# Patient Record
Sex: FEMALE | URBAN - METROPOLITAN AREA
[De-identification: names, ages, dates, MRNs, and addresses within clinical notes are randomized per-mention and may not be internally consistent; named-entity substitution may affect disease eponyms.]

---

## 2017-05-04 ENCOUNTER — HOSPITAL ENCOUNTER (OUTPATIENT)
Dept: MAMMOGRAPHY | Age: 47
Discharge: HOME | End: 2017-05-04

## 2017-05-04 DIAGNOSIS — Z12.31 VISIT FOR SCREENING MAMMOGRAM: ICD-10-CM

## 2019-10-22 ENCOUNTER — APPOINTMENT (OUTPATIENT)
Dept: GENERAL RADIOLOGY | Facility: HOSPITAL | Age: 49
End: 2019-10-22

## 2019-10-22 ENCOUNTER — APPOINTMENT (OUTPATIENT)
Dept: CT IMAGING | Facility: HOSPITAL | Age: 49
End: 2019-10-22

## 2019-10-22 ENCOUNTER — HOSPITAL ENCOUNTER (EMERGENCY)
Facility: HOSPITAL | Age: 49
Discharge: HOME | End: 2019-10-22
Attending: EMERGENCY MEDICINE | Admitting: EMERGENCY MEDICINE

## 2019-10-22 VITALS
SYSTOLIC BLOOD PRESSURE: 122 MMHG | RESPIRATION RATE: 16 BRPM | DIASTOLIC BLOOD PRESSURE: 84 MMHG | WEIGHT: 162 LBS | TEMPERATURE: 98.4 F | OXYGEN SATURATION: 94 % | BODY MASS INDEX: 25.43 KG/M2 | HEART RATE: 71 BPM | HEIGHT: 67 IN

## 2019-10-22 DIAGNOSIS — R55 SYNCOPE, UNSPECIFIED SYNCOPE TYPE: Primary | ICD-10-CM

## 2019-10-22 DIAGNOSIS — R07.9 CHEST PAIN, UNSPECIFIED TYPE: ICD-10-CM

## 2019-10-22 DIAGNOSIS — Z87.09 HISTORY OF ASTHMA: ICD-10-CM

## 2019-10-22 LAB
ALBUMIN SERPL-MCNC: 4.4 G/DL (ref 3.5–5.2)
ALBUMIN/GLOB SERPL: 1.5 G/DL
ALP SERPL-CCNC: 65 U/L (ref 39–117)
ALT SERPL W P-5'-P-CCNC: 19 U/L (ref 1–33)
ANION GAP SERPL CALCULATED.3IONS-SCNC: 10 MMOL/L (ref 5–15)
AST SERPL-CCNC: 19 U/L (ref 1–32)
BASOPHILS # BLD AUTO: 0.04 10*3/MM3 (ref 0–0.2)
BASOPHILS NFR BLD AUTO: 0.7 % (ref 0–1.5)
BILIRUB SERPL-MCNC: 0.3 MG/DL (ref 0.2–1.2)
BUN BLD-MCNC: 15 MG/DL (ref 6–20)
BUN/CREAT SERPL: 17.9 (ref 7–25)
CALCIUM SPEC-SCNC: 9.7 MG/DL (ref 8.6–10.5)
CHLORIDE SERPL-SCNC: 102 MMOL/L (ref 98–107)
CO2 SERPL-SCNC: 27 MMOL/L (ref 22–29)
CREAT BLD-MCNC: 0.84 MG/DL (ref 0.57–1)
DEPRECATED RDW RBC AUTO: 43.3 FL (ref 37–54)
EOSINOPHIL # BLD AUTO: 0.05 10*3/MM3 (ref 0–0.4)
EOSINOPHIL NFR BLD AUTO: 0.9 % (ref 0.3–6.2)
ERYTHROCYTE [DISTWIDTH] IN BLOOD BY AUTOMATED COUNT: 12.6 % (ref 12.3–15.4)
GFR SERPL CREATININE-BSD FRML MDRD: 71 ML/MIN/1.73
GLOBULIN UR ELPH-MCNC: 3 GM/DL
GLUCOSE BLD-MCNC: 99 MG/DL (ref 65–99)
HCT VFR BLD AUTO: 45.2 % (ref 34–46.6)
HGB BLD-MCNC: 14.6 G/DL (ref 12–15.9)
HOLD SPECIMEN: NORMAL
HOLD SPECIMEN: NORMAL
IMM GRANULOCYTES # BLD AUTO: 0.04 10*3/MM3 (ref 0–0.05)
IMM GRANULOCYTES NFR BLD AUTO: 0.7 % (ref 0–0.5)
LIPASE SERPL-CCNC: 23 U/L (ref 13–60)
LYMPHOCYTES # BLD AUTO: 1.55 10*3/MM3 (ref 0.7–3.1)
LYMPHOCYTES NFR BLD AUTO: 27 % (ref 19.6–45.3)
MCH RBC QN AUTO: 30.1 PG (ref 26.6–33)
MCHC RBC AUTO-ENTMCNC: 32.3 G/DL (ref 31.5–35.7)
MCV RBC AUTO: 93.2 FL (ref 79–97)
MONOCYTES # BLD AUTO: 0.63 10*3/MM3 (ref 0.1–0.9)
MONOCYTES NFR BLD AUTO: 11 % (ref 5–12)
NEUTROPHILS # BLD AUTO: 3.43 10*3/MM3 (ref 1.7–7)
NEUTROPHILS NFR BLD AUTO: 59.7 % (ref 42.7–76)
NRBC BLD AUTO-RTO: 0 /100 WBC (ref 0–0.2)
NT-PROBNP SERPL-MCNC: 16.2 PG/ML (ref 5–900)
PLATELET # BLD AUTO: 249 10*3/MM3 (ref 140–450)
PMV BLD AUTO: 9.7 FL (ref 6–12)
POTASSIUM BLD-SCNC: 4.4 MMOL/L (ref 3.5–5.2)
PROT SERPL-MCNC: 7.4 G/DL (ref 6–8.5)
RBC # BLD AUTO: 4.85 10*6/MM3 (ref 3.77–5.28)
SODIUM BLD-SCNC: 139 MMOL/L (ref 136–145)
TROPONIN T SERPL-MCNC: <0.01 NG/ML (ref 0–0.03)
TROPONIN T SERPL-MCNC: <0.01 NG/ML (ref 0–0.03)
WBC NRBC COR # BLD: 5.74 10*3/MM3 (ref 3.4–10.8)
WHOLE BLOOD HOLD SPECIMEN: NORMAL
WHOLE BLOOD HOLD SPECIMEN: NORMAL

## 2019-10-22 PROCEDURE — 83880 ASSAY OF NATRIURETIC PEPTIDE: CPT | Performed by: EMERGENCY MEDICINE

## 2019-10-22 PROCEDURE — 85025 COMPLETE CBC W/AUTO DIFF WBC: CPT | Performed by: EMERGENCY MEDICINE

## 2019-10-22 PROCEDURE — 94640 AIRWAY INHALATION TREATMENT: CPT

## 2019-10-22 PROCEDURE — 71045 X-RAY EXAM CHEST 1 VIEW: CPT

## 2019-10-22 PROCEDURE — 99285 EMERGENCY DEPT VISIT HI MDM: CPT

## 2019-10-22 PROCEDURE — 36415 COLL VENOUS BLD VENIPUNCTURE: CPT

## 2019-10-22 PROCEDURE — 93005 ELECTROCARDIOGRAM TRACING: CPT | Performed by: EMERGENCY MEDICINE

## 2019-10-22 PROCEDURE — 70450 CT HEAD/BRAIN W/O DYE: CPT

## 2019-10-22 PROCEDURE — 83690 ASSAY OF LIPASE: CPT | Performed by: EMERGENCY MEDICINE

## 2019-10-22 PROCEDURE — 94799 UNLISTED PULMONARY SVC/PX: CPT

## 2019-10-22 PROCEDURE — 80053 COMPREHEN METABOLIC PANEL: CPT | Performed by: EMERGENCY MEDICINE

## 2019-10-22 PROCEDURE — 84484 ASSAY OF TROPONIN QUANT: CPT | Performed by: EMERGENCY MEDICINE

## 2019-10-22 RX ORDER — IPRATROPIUM BROMIDE AND ALBUTEROL SULFATE 2.5; .5 MG/3ML; MG/3ML
3 SOLUTION RESPIRATORY (INHALATION) ONCE
Status: COMPLETED | OUTPATIENT
Start: 2019-10-22 | End: 2019-10-22

## 2019-10-22 RX ORDER — LISINOPRIL 20 MG/1
20 TABLET ORAL DAILY
COMMUNITY

## 2019-10-22 RX ORDER — ASPIRIN 81 MG/1
324 TABLET, CHEWABLE ORAL ONCE
Status: COMPLETED | OUTPATIENT
Start: 2019-10-22 | End: 2019-10-22

## 2019-10-22 RX ORDER — ESCITALOPRAM OXALATE 20 MG/1
20 TABLET ORAL DAILY
COMMUNITY

## 2019-10-22 RX ORDER — SODIUM CHLORIDE 0.9 % (FLUSH) 0.9 %
10 SYRINGE (ML) INJECTION AS NEEDED
Status: DISCONTINUED | OUTPATIENT
Start: 2019-10-22 | End: 2019-10-22 | Stop reason: HOSPADM

## 2019-10-22 RX ORDER — ALBUTEROL SULFATE 90 UG/1
2 AEROSOL, METERED RESPIRATORY (INHALATION) EVERY 6 HOURS PRN
Qty: 3.7 G | Refills: 0 | Status: SHIPPED | OUTPATIENT
Start: 2019-10-22 | End: 2019-10-29

## 2019-10-22 RX ORDER — EPINEPHRINE 0.3 MG/.3ML
0.3 INJECTION SUBCUTANEOUS ONCE
Qty: 1 EACH | Refills: 0 | Status: SHIPPED | OUTPATIENT
Start: 2019-10-22 | End: 2019-10-22

## 2019-10-22 RX ORDER — ALBUTEROL SULFATE 0.63 MG/3ML
1 SOLUTION RESPIRATORY (INHALATION) EVERY 6 HOURS PRN
Qty: 3 ML | Refills: 0 | Status: SHIPPED | OUTPATIENT
Start: 2019-10-22

## 2019-10-22 RX ADMIN — IPRATROPIUM BROMIDE AND ALBUTEROL SULFATE 3 ML: 2.5; .5 SOLUTION RESPIRATORY (INHALATION) at 11:55

## 2019-10-22 RX ADMIN — ASPIRIN 81 MG 324 MG: 81 TABLET ORAL at 10:49

## 2019-10-22 NOTE — ED PROVIDER NOTES
"Subjective   Yamilet Allan is a 49 y.o.female who presents to the ED status post syncopal episode. The patient experienced an episode of syncope earlier today. Prior to the syncopal episode, the patient had been experiencing a cough before ultimately having an \"asthma attack\". While having the asthma attack, she lost consciousness according to one of her co workers and slumped over in her chair. Upon regaining consciousness, she did utilize an inhaler which helped relieve her asthma attack. Of note, she states she has experienced multiple syncopal episodes while having an asthma attack in the past, for she states she has required intubation in the past. She currently complains of mild chest tightness and fatigue. She denies any sore throat or rhinorrhea. She states she did experience vomiting and diarrhea last week, but it has resolved. Moreover, she has a history of HTN, HLD, CVA, A Fib ? (not anticoagulated), and hypertropic cardiomyopathy. She has hx of anaphylactic reactions to mold and environmental triggers but states this was not her typical anaphylactic reaction but they did clean her office yesterday. She also started taking an antidepressant three weeks ago and admits to increased stress with starting a new job 5 weeks ago and daughter having a miscarriage 3 weeks ago. She had been followed by cardiologist Dr. Barnard at [facility] in [place] but moved to [facility] about a year ago and hasn't established with cardiology. She has hx of a Loop recorder placed 5/2018 due to her syncope. No prior hx of cardiac stents. There are no other complaints at this time.         History provided by:  Patient, relative and medical records  Syncope   Episode history:  Single  Most recent episode:  Today  Duration: momentarily.  Timing:  Constant  Progression:  Resolved  Chronicity:  Recurrent  Context: sitting down    Witnessed: yes    Relieved by:  None tried  Worsened by:  Nothing  Ineffective treatments:  None tried  Associated " symptoms: anxiety, chest pain and shortness of breath    Associated symptoms: no dizziness, no fever, no focal sensory loss, no focal weakness, no headaches, no malaise/fatigue, no nausea, no palpitations, no vomiting and no weakness    Risk factors: no coronary artery disease and no seizures        Review of Systems   Constitutional: Positive for fatigue. Negative for fever and malaise/fatigue.   HENT: Negative for rhinorrhea and sore throat.    Respiratory: Positive for cough, chest tightness and shortness of breath.    Cardiovascular: Positive for chest pain and syncope. Negative for palpitations and leg swelling.   Gastrointestinal: Negative for diarrhea, nausea and vomiting.   Neurological: Positive for syncope. Negative for dizziness, focal weakness, weakness and headaches.   All other systems reviewed and are negative.      Past Medical History:   Diagnosis Date   • Asthma    • Cardiomyopathy (CMS/HCC)    • Hyperlipidemia    • Hypertension    • Stroke (CMS/HCC)        Allergies   Allergen Reactions   • Penicillins Anaphylaxis       Past Surgical History:   Procedure Laterality Date   • ADENOIDECTOMY     •  SECTION     • OTHER SURGICAL HISTORY      LOOP RECORDER   • RHINOPLASTY     • TONSILLECTOMY         History reviewed. No pertinent family history.    Social History     Socioeconomic History   • Marital status: Single     Spouse name: Not on file   • Number of children: Not on file   • Years of education: Not on file   • Highest education level: Not on file   Tobacco Use   • Smoking status: Never Smoker   • Smokeless tobacco: Never Used   Substance and Sexual Activity   • Alcohol use: No     Frequency: Never   • Drug use: No   • Sexual activity: Defer         Objective   Physical Exam   Constitutional: She is oriented to person, place, and time. She appears well-developed and well-nourished.   Emotional, tearful.    HENT:   Head: Normocephalic and atraumatic.   Eyes: Conjunctivae are normal. No  scleral icterus.   Neck: Normal range of motion. Neck supple.   Cardiovascular: Normal rate, regular rhythm, normal heart sounds and intact distal pulses.   No murmur heard.  Pulmonary/Chest: Effort normal and breath sounds normal. No respiratory distress. She has no wheezes.   Abdominal: Soft. Bowel sounds are normal. There is no tenderness.   Musculoskeletal: Normal range of motion. She exhibits no edema.   Neurological: She is alert and oriented to person, place, and time.   Skin: Skin is warm and dry.   Psychiatric:   Emotional, tearful.    Nursing note and vitals reviewed.      Procedures         ED Course      Re-examined patient several times in ED. Pt resting, no distress, vitals stable. Discussed results and tx plan. Pt feeling better after Neb. She admits to being out of her Albuterol inhaler, Neb solution and Epi pens. Will refill Rx's and discussed close f/u with PCP. Will refer to cardiac clinic / syncope clinic. She will return to ED if new or worse sx.     Discussed patient with Dr. Emmanuel who is agreeable with ED course.     Reviewed old records.   [facility]  Transthoracic echo December 2017. Normal LV and RV function. Grade 2 diastolic dysfunction.    Exercise nuclear stress test. December 2017. 9 minutes 45 seconds. Cruz treadmill score of 10. Low risk. Normal perfusion and function       Recent Results (from the past 24 hour(s))   Troponin    Collection Time: 10/22/19 11:09 AM   Result Value Ref Range    Troponin T <0.010 0.000 - 0.030 ng/mL   Comprehensive Metabolic Panel    Collection Time: 10/22/19 11:09 AM   Result Value Ref Range    Glucose 99 65 - 99 mg/dL    BUN 15 6 - 20 mg/dL    Creatinine 0.84 0.57 - 1.00 mg/dL    Sodium 139 136 - 145 mmol/L    Potassium 4.4 3.5 - 5.2 mmol/L    Chloride 102 98 - 107 mmol/L    CO2 27.0 22.0 - 29.0 mmol/L    Calcium 9.7 8.6 - 10.5 mg/dL    Total Protein 7.4 6.0 - 8.5 g/dL    Albumin 4.40 3.50 - 5.20 g/dL    ALT (SGPT) 19 1 - 33 U/L    AST  (SGOT) 19 1 - 32 U/L    Alkaline Phosphatase 65 39 - 117 U/L    Total Bilirubin 0.3 0.2 - 1.2 mg/dL    eGFR Non African Amer 71 >60 mL/min/1.73    Globulin 3.0 gm/dL    A/G Ratio 1.5 g/dL    BUN/Creatinine Ratio 17.9 7.0 - 25.0    Anion Gap 10.0 5.0 - 15.0 mmol/L   Lipase    Collection Time: 10/22/19 11:09 AM   Result Value Ref Range    Lipase 23 13 - 60 U/L   BNP    Collection Time: 10/22/19 11:09 AM   Result Value Ref Range    proBNP 16.2 5.0 - 900.0 pg/mL   Light Blue Top    Collection Time: 10/22/19 11:09 AM   Result Value Ref Range    Extra Tube hold for add-on    Green Top (Gel)    Collection Time: 10/22/19 11:09 AM   Result Value Ref Range    Extra Tube Hold for add-ons.    Gold Top - SST    Collection Time: 10/22/19 11:09 AM   Result Value Ref Range    Extra Tube Hold for add-ons.    Lavender Top    Collection Time: 10/22/19 11:10 AM   Result Value Ref Range    Extra Tube hold for add-on    CBC Auto Differential    Collection Time: 10/22/19 11:10 AM   Result Value Ref Range    WBC 5.74 3.40 - 10.80 10*3/mm3    RBC 4.85 3.77 - 5.28 10*6/mm3    Hemoglobin 14.6 12.0 - 15.9 g/dL    Hematocrit 45.2 34.0 - 46.6 %    MCV 93.2 79.0 - 97.0 fL    MCH 30.1 26.6 - 33.0 pg    MCHC 32.3 31.5 - 35.7 g/dL    RDW 12.6 12.3 - 15.4 %    RDW-SD 43.3 37.0 - 54.0 fl    MPV 9.7 6.0 - 12.0 fL    Platelets 249 140 - 450 10*3/mm3    Neutrophil % 59.7 42.7 - 76.0 %    Lymphocyte % 27.0 19.6 - 45.3 %    Monocyte % 11.0 5.0 - 12.0 %    Eosinophil % 0.9 0.3 - 6.2 %    Basophil % 0.7 0.0 - 1.5 %    Immature Grans % 0.7 (H) 0.0 - 0.5 %    Neutrophils, Absolute 3.43 1.70 - 7.00 10*3/mm3    Lymphocytes, Absolute 1.55 0.70 - 3.10 10*3/mm3    Monocytes, Absolute 0.63 0.10 - 0.90 10*3/mm3    Eosinophils, Absolute 0.05 0.00 - 0.40 10*3/mm3    Basophils, Absolute 0.04 0.00 - 0.20 10*3/mm3    Immature Grans, Absolute 0.04 0.00 - 0.05 10*3/mm3    nRBC 0.0 0.0 - 0.2 /100 WBC     Note: In addition to lab results from this visit, the labs listed above  may include labs taken at another facility or during a different encounter within the last 24 hours. Please correlate lab times with ED admission and discharge times for further clarification of the services performed during this visit.    CT Head Without Contrast   Preliminary Result   Normal unenhanced CT scan of the head.       D:  10/22/2019   E:  10/22/2019                  XR Chest 1 View   Preliminary Result   No active disease.       D:  10/22/2019   E:  10/22/2019                Vitals:    10/22/19 1230 10/22/19 1233 10/22/19 1259 10/22/19 1300   BP: 108/74   130/76   Pulse: 79 75 86    Resp:       Temp:       TempSrc:       SpO2: 90% 92% 92%    Weight:       Height:         Medications   sodium chloride 0.9 % flush 10 mL (not administered)   aspirin chewable tablet 324 mg (324 mg Oral Given 10/22/19 1049)   ipratropium-albuterol (DUO-NEB) nebulizer solution 3 mL (3 mL Nebulization Given 10/22/19 1155)     ECG/EMG Results (last 24 hours)     Procedure Component Value Units Date/Time    ECG 12 Lead [[id number]] Collected:  10/22/19 1051     Updated:  10/22/19 1105        ECG 12 Lead   Final Result   Test Reason : 2nd set   Blood Pressure : **/** mmHG   Vent. Rate : 072 BPM     Atrial Rate : 072 BPM      P-R Int : 198 ms          QRS Dur : 084 ms       QT Int : 436 ms       P-R-T Axes : 016 -18 015 degrees      QTc Int : 477 ms      Normal sinus rhythm   Inferior infarct (cited on or before 22-OCT-2019)   Possible Anterior infarct (cited on or before 22-OCT-2019)   Abnormal ECG   When compared with ECG of 22-OCT-2019 10:51,   No significant change was found   Confirmed by TIFFANY DAMIAN MD ([id number]) on 10/22/2019 1:26:22 PM      Referred By:  edmd           Confirmed By:TIFFANY DAMIAN MD      ECG 12 Lead   Final Result   Test Reason : chest pain   Blood Pressure : **/** mmHG   Vent. Rate : 077 BPM     Atrial Rate : 077 BPM      P-R Int : 212 ms          QRS Dur : 080 ms       QT Int : 406 ms       P-R-T Axes :  028 -20 019 degrees      QTc Int : 459 ms      Sinus rhythm with 1st degree AV block   no stemi   Inferior infarct , age undetermined   Anterior infarct , age undetermined   Abnormal ECG   No previous ECGs available   Confirmed by TIFFANY DAMIAN MD ([id number]) on 10/22/2019 11:20:36 AM      Referred By:  veena smith           Confirmed By:TIFFANY DAMIAN MD                          [facility]    Final diagnoses:   Syncope, unspecified syncope type   History of asthma   Chest pain, unspecified type       Documentation assistance provided by deysi Roger.  Information recorded by the scribe was done at my direction and has been verified and validated by me.     Enzo Roger  10/22/19 1119       Julissa Danielson PA  10/22/19 2865

## 2020-01-14 ENCOUNTER — CONSULT (OUTPATIENT)
Dept: CARDIOLOGY | Facility: CLINIC | Age: 50
End: 2020-01-14

## 2020-01-14 VITALS
SYSTOLIC BLOOD PRESSURE: 122 MMHG | WEIGHT: 193 LBS | HEART RATE: 74 BPM | DIASTOLIC BLOOD PRESSURE: 86 MMHG | BODY MASS INDEX: 30.29 KG/M2 | HEIGHT: 67 IN | OXYGEN SATURATION: 98 %

## 2020-01-14 DIAGNOSIS — R55 SYNCOPE AND COLLAPSE: Primary | ICD-10-CM

## 2020-01-14 DIAGNOSIS — I10 ESSENTIAL HYPERTENSION: ICD-10-CM

## 2020-01-14 PROCEDURE — 99204 OFFICE O/P NEW MOD 45 MIN: CPT | Performed by: INTERNAL MEDICINE

## 2020-01-14 RX ORDER — EPINEPHRINE 0.3 MG/.3ML
INJECTION SUBCUTANEOUS AS NEEDED
COMMUNITY
Start: 2019-11-05

## 2020-01-14 NOTE — PROGRESS NOTES
[facility]  Consultation H&P  Yamilet Allan  1970    There is no work phone number on file..    VISIT DATE:  01/14/2020    PCP: Jaimee Bobo MD  [address] [place] [id number]    CC:  Chief Complaint   Patient presents with   • Atrial Fibrillation   • Chest Pain   • Shortness of Breath   • Dizziness   • Leg Pain     Previous cardiac studies and procedures:  November 2017  - event monitor for palpitations: baseline rhythm sinus. Rare PVCs and couplets. Multiple episodes of chest discomfort and dyspnea and skipped beats during which the patient was either in sinus rhythm or sinus tachycardia with rare PVCs and couplets during such episodes    December 2017   Echo: Normal EF, grade 2 diastolic dysfunction  Exercise myocardial perfusion imaging: Preserved functional capacity, normal EF, no ischemia or scar.    Feb 2018: Medtronic loop recorder implant    ASSESSMENT:   Diagnosis Plan   1. Syncope and collapse     2. Essential hypertension       Device interrogation:  Medtronic loop  No arrhythmia.    PLAN:  Recurrent syncope: Previous evaluation has been negative for arrhythmia.  Suspect due to predominant vasodepressor, neurocardiogenic etiology potentially due to the some underlying autonomic dysfunction.  Continue conservative measures.  Avoid dehydration.  Counseled on regular exercise, avoidance of sleep deprivation.  We will continue to trend for arrhythmia through loop recorder.  She does wish to have it explanted once the battery is at end-of-life.    History of stroke: No residual deficits.  Unclear which healthcare facility from which to obtain this previous evaluation.  Will request any records obtained by her primary care physician.  Would recommend initiation of low-dose aspirin and statin therapy if this event could be corroborated.    Family history of hypertrophic cardiomyopathy: Counseled her that she needs to find out more information regarding the specific  genetic mutation of her brother, once this information is obtained it would be easy to have her tested.  Repeat transthoracic echo pending to assess underlying myocardial structure and function.    Hypertension: Goal less than 130/80 mmHg.  Currently well controlled.  Continue nighttime dosing of lisinopril.    History of Present Illness   49-year-old female with a history of recurrent syncope, reported stroke, potential hypertrophic cardiomyopathy, palpitations, and hypertension.  She has had a fragmented healthcare over the previous 5 years and multiple regions to include [place] followed by [facility] followed by [facility] [place].  Per her history she was evaluated for a stroke in approximately 2015.  It is unclear exactly which medical facility this evaluation occurred.  She was transiently on aspirin and statin therapy following this.  Shortly before this episode she began to have recurrent unexplained syncope.  She would have brief loss of consciousness, usually in the standing position but would intermittently occur in the seated position.  No significant prodrome.  Intermittently appear to be induced by allergy induced asthma with coughing however she does not feel that this was a significant trigger.  Some mild associated nausea but no chest discomfort or palpitations.  She underwent loop recorder implantation in February 2018, it has been negative for arrhythmia.  She has had 3 episodes of syncope over the previous 3 to 4 months.  Event monitor negative for significant arrhythmia in November 2017.  She underwent an echo and myocardial perfusion imaging in December 2017 which was remarkable for diastolic dysfunction.  She reports that her brother who is 60 has heart failure and has had multiple myocardial infarctions, bypass surgery and is potentially being considered for heart transplant.  It appears he was also diagnosed with a genetic mutation for hypertrophic cardiomyopathy.  She also has a  "sister with symptomatic obstructive coronary disease.  She is not been tested for hypertrophic cardiomyopathy.  Her previous cardiologist gave her the diagnosis of hypertrophic cardiomyopathy although significant LVH is not reported on the echo from December 2017.  Her blood pressure appears to be well controlled, typically running less than 130/80 mmHg.  She takes her lisinopril prior to bedtime.  No history of tilt table testing.    PHYSICAL EXAMINATION:  Vitals:    01/14/20 1506   Weight: 87.5 kg (193 lb)   Height: 170.2 cm (67\")     General Appearance:    Alert, cooperative, no distress, appears stated age   Head:    Normocephalic, without obvious abnormality, atraumatic   Eyes:    conjunctiva/corneas clear, EOM's intact, fundi     benign, both eyes   Ears:    Normal TM's and external ear canals, both ears   Nose:   Nares normal, septum midline, mucosa normal, no drainage    or sinus tenderness   Throat:   Lips, mucosa, and tongue normal; teeth and gums normal   Neck:   Supple, symmetrical, trachea midline, no adenopathy;     thyroid:  no enlargement/tenderness/nodules; no carotid    bruit or JVD   Back:     Symmetric, no curvature, ROM normal, no CVA tenderness   Lungs:     Clear to auscultation bilaterally, respirations unlabored   Chest Wall:    No tenderness or deformity    Heart:    Regular rate and rhythm, S1 and S2 normal, no murmur, rub   or gallop, normal carotid impulse bilaterally without bruit.   Abdomen:     Soft, non-tender, bowel sounds active all four quadrants,     no masses, no organomegaly   Extremities:   Extremities normal, atraumatic, no cyanosis or edema   Pulses:   2+ and symmetric all extremities   Skin:   Skin color, texture, turgor normal, no rashes or lesions   Lymph nodes:   Cervical, supraclavicular, and axillary nodes normal   Neurologic:   normal strength, sensation intact     throughout       Diagnostic Data:  Procedures  No results found for: CHLPL, TRIG, HDL, LDLDIRECT  Lab " Results   Component Value Date    GLUCOSE 99 10/22/2019    BUN 15 10/22/2019    CREATININE 0.84 10/22/2019     10/22/2019    K 4.4 10/22/2019     10/22/2019    CO2 27.0 10/22/2019     No results found for: HGBA1C  Lab Results   Component Value Date    WBC 5.74 10/22/2019    HGB 14.6 10/22/2019    HCT 45.2 10/22/2019     10/22/2019       PROBLEM LIST:  Patient Active Problem List   Diagnosis   • Syncope and collapse   • Essential hypertension       PAST MEDICAL HX  Past Medical History:   Diagnosis Date   • Anxiety and depression    • Asthma    • Cardiomyopathy (CMS/HCC)    • Hyperlipidemia    • Hypertension    • Menopause    • Stroke (CMS/HCC)        Allergies  Allergies   Allergen Reactions   • Penicillins Anaphylaxis       Current Medications    Current Outpatient Medications:   •  albuterol (ACCUNEB) 0.63 MG/3ML nebulizer solution, Take 3 mL by nebulization Every 6 (Six) Hours As Needed for Wheezing., Disp: 3 mL, Rfl: 0  •  EPINEPHrine (EPIPEN) 0.3 MG/0.3ML solution auto-injector injection, As Needed., Disp: , Rfl:   •  escitalopram (LEXAPRO) 10 MG tablet, Take 10 mg by mouth Daily., Disp: , Rfl:   •  lisinopril (PRINIVIL,ZESTRIL) 40 MG tablet, Take 40 mg by mouth Daily., Disp: , Rfl:          ROS  Review of Systems   Constitution: Positive for malaise/fatigue and weight gain.   HENT: Positive for tinnitus.    Cardiovascular: Positive for chest pain and palpitations.   Respiratory: Positive for cough and shortness of breath.    Gastrointestinal: Positive for nausea.   Psychiatric/Behavioral: Positive for memory loss.     All other body systems reviewed and are negative    SOCIAL HX  Social History     Socioeconomic History   • Marital status: Single     Spouse name: Not on file   • Number of children: Not on file   • Years of education: Not on file   • Highest education level: Not on file   Tobacco Use   • Smoking status: Never Smoker   • Smokeless tobacco: Never Used   Substance and Sexual  Activity   • Alcohol use: No     Frequency: Never   • Drug use: No   • Sexual activity: Defer       FAMILY HX  Family History   Problem Relation Age of Onset   • Hypertension Mother    • Heart failure Father    • Cardiomyopathy Sister    • Heart attack Sister    • Diabetes Sister    • Stroke Brother    • Heart attack Brother    • Hypertension Brother    • Hyperlipidemia Brother    • Stroke Brother    • Hyperlipidemia Brother    • Hypertension Brother    • Hypertension Sister    • Hypertension Sister    • Hypertension Sister    • Hypertension Sister    • Hypertension Sister    • No Known Problems Sister    • No Known Problems Sister              Veto Atwood III, MD, FAC

## 2020-01-22 ENCOUNTER — TELEPHONE (OUTPATIENT)
Dept: CARDIOLOGY | Facility: CLINIC | Age: 50
End: 2020-01-22

## 2020-01-22 NOTE — TELEPHONE ENCOUNTER
Patient notified of message above from . Letter written. Patient verbalized understanding and is going to  letter today at office.

## 2020-01-22 NOTE — TELEPHONE ENCOUNTER
was sitting in a meeting at work yesterday and she hyperventilated. Passed out for a few seconds.She has been doing deep breathing exercises to help. She thinks its anxiety. Her work is requesting a note from her Cardiologist for her to be able to return. She is requesting a note that  can return to work with no restrictions including driving. Please advise.

## 2020-09-08 ENCOUNTER — OFFICE VISIT (OUTPATIENT)
Dept: CARDIOLOGY | Facility: CLINIC | Age: 50
End: 2020-09-08

## 2020-09-08 VITALS
HEIGHT: 68 IN | SYSTOLIC BLOOD PRESSURE: 114 MMHG | BODY MASS INDEX: 27.43 KG/M2 | OXYGEN SATURATION: 95 % | WEIGHT: 181 LBS | TEMPERATURE: 97.5 F | DIASTOLIC BLOOD PRESSURE: 76 MMHG | HEART RATE: 64 BPM

## 2020-09-08 DIAGNOSIS — I10 ESSENTIAL HYPERTENSION: ICD-10-CM

## 2020-09-08 DIAGNOSIS — R55 SYNCOPE AND COLLAPSE: Primary | ICD-10-CM

## 2020-09-08 PROCEDURE — 99214 OFFICE O/P EST MOD 30 MIN: CPT | Performed by: INTERNAL MEDICINE

## 2020-09-08 PROCEDURE — 93291 INTERROG DEV EVAL SCRMS IP: CPT | Performed by: INTERNAL MEDICINE

## 2020-09-08 NOTE — PROGRESS NOTES
[facility] at [facility]  Office visit  Yamilet Allan  1970    There is no work phone number on file.    VISIT DATE:  9/8/2020    PCP: Jaimee Bobo MD  [address] [place], [place] [id number]    CC:  Chief Complaint   Patient presents with   • Loss of Consciousness   • Essential Hypertension       Previous cardiac studies and procedures:  November 2017  - event monitor for palpitations: baseline rhythm sinus. Rare PVCs and couplets. Multiple episodes of chest discomfort and dyspnea and skipped beats during which the patient was either in sinus rhythm or sinus tachycardia with rare PVCs and couplets during such episodes     December 2017   Echo: Normal EF, grade 2 diastolic dysfunction  Exercise myocardial perfusion imaging: Preserved functional capacity, normal EF, no ischemia or scar.     Feb 2018: Medtronic loop recorder implant    ASSESSMENT:   Diagnosis Plan   1. Syncope and collapse     2. Essential hypertension       Device interrogation:  Medtronic loop: 2 pauses, 7 and 8 seconds duration.    PLAN:  Recurrent syncope: Significant episodes of transient high-grade AV block and sinus arrest, unclear symptomatic correlation.  Recommending evaluation with EP colleague for dual-chamber permanent pacemaker.  At least some of her episodes of presyncope and syncope are related to neurocardiogenic etiology and orthostasis.  We will arrange for transthoracic echo prior to implant.     History of stroke: No residual deficits.  Poorly documented.     Family history of hypertrophic cardiomyopathy: Counseled her that she needs to find out more information regarding the specific genetic mutation of her brother, once this information is obtained it would be easy to have her tested.  Repeat transthoracic echo pending to assess underlying myocardial structure and function.     Hypertension: Goal less than 130/80 mmHg.  Currently well controlled.  Continue nighttime dosing of lisinopril.    Subjective  Interval  assessment: Still having episodes of presyncope.  Most significant episode occurred when she had bent over to put the leash on her dog and she stood back up.  She is currently exercising by jogging a mile most days a week without difficulty.  Device interrogation revealed 2 significant pauses over the previous 8 months one episode appear to correlate with transient high-grade AV block.  Second episode correlated with sinus arrest without a significant underlying escape rhythm.  She cannot correlate any symptoms roughly coinciding with the times of these episodes, however she is not the most exact historian.  She has not been able to obtain the genetic testing from a brother.    Initial evaluation:49-year-old female with a history of recurrent syncope, reported stroke, potential hypertrophic cardiomyopathy, palpitations, and hypertension.  She has had a fragmented healthcare over the previous 5 years and multiple regions to include [place] followed by [facility] followed by [facility] back to [place].  Per her history she was evaluated for a stroke in approximately 2015.  It is unclear exactly which medical facility this evaluation occurred.  She was transiently on aspirin and statin therapy following this.  Shortly before this episode she began to have recurrent unexplained syncope.  She would have brief loss of consciousness, usually in the standing position but would intermittently occur in the seated position.  No significant prodrome.  Intermittently appear to be induced by allergy induced asthma with coughing however she does not feel that this was a significant trigger.  Some mild associated nausea but no chest discomfort or palpitations.  She underwent loop recorder implantation in February 2018, it has been negative for arrhythmia.  She has had 3 episodes of syncope over the previous 3 to 4 months.  Event monitor negative for significant arrhythmia in November 2017.  She underwent an echo and myocardial  "perfusion imaging in December 2017 which was remarkable for diastolic dysfunction.  She reports that her brother who is 60 has heart failure and has had multiple myocardial infarctions, bypass surgery and is potentially being considered for heart transplant.  It appears he was also diagnosed with a genetic mutation for hypertrophic cardiomyopathy.  She also has a sister with symptomatic obstructive coronary disease.  She is not been tested for hypertrophic cardiomyopathy.  Her previous cardiologist gave her the diagnosis of hypertrophic cardiomyopathy although significant LVH is not reported on the echo from December 2017.  Her blood pressure appears to be well controlled, typically running less than 130/80 mmHg.  She takes her lisinopril prior to bedtime.  No history of tilt table testing.    PHYSICAL EXAMINATION:  Vitals:    09/08/20 1150   BP: 114/76   BP Location: Right arm   Patient Position: Sitting   Pulse: 64   Temp: 97.5 °F (36.4 °C)   SpO2: 95%   Weight: 82.1 kg (181 lb)   Height: 171.5 cm (67.5\")     General Appearance:    Alert, cooperative, no distress, appears stated age   Head:    Normocephalic, without obvious abnormality, atraumatic   Eyes:    conjunctiva/corneas clear   Nose:   Nares normal, septum midline, mucosa normal, no drainage   Throat:   Lips, teeth and gums normal   Neck:   Supple, symmetrical, trachea midline, no carotid    bruit or JVD   Lungs:     Clear to auscultation bilaterally, respirations unlabored   Chest Wall:    No tenderness or deformity    Heart:    Regular rate and rhythm, S1 and S2 normal, no murmur, rub   or gallop, normal carotid impulse bilaterally without bruit.   Abdomen:     Soft, non-tender   Extremities:   Extremities normal, atraumatic, no cyanosis or edema   Pulses:   2+ and symmetric all extremities   Skin:   Skin color, texture, turgor normal, no rashes or lesions       Diagnostic Data:  Procedures  No results found for: CHLPL, TRIG, HDL, LDLDIRECT  Lab Results "   Component Value Date    GLUCOSE 99 10/22/2019    BUN 15 10/22/2019    CREATININE 0.84 10/22/2019     10/22/2019    K 4.4 10/22/2019     10/22/2019    CO2 27.0 10/22/2019     No results found for: HGBA1C  Lab Results   Component Value Date    WBC 5.74 10/22/2019    HGB 14.6 10/22/2019    HCT 45.2 10/22/2019     10/22/2019       Allergies  Allergies   Allergen Reactions   • Bee Venom Anaphylaxis   • Cat Hair Extract Anaphylaxis   • Molds & Smuts Anaphylaxis   • Penicillins Anaphylaxis       Current Medications    Current Outpatient Medications:   •  albuterol (ACCUNEB) 0.63 MG/3ML nebulizer solution, Take 3 mL by nebulization Every 6 (Six) Hours As Needed for Wheezing., Disp: 3 mL, Rfl: 0  •  EPINEPHrine (EPIPEN) 0.3 MG/0.3ML solution auto-injector injection, As Needed., Disp: , Rfl:   •  escitalopram (LEXAPRO) 20 MG tablet, Take 20 mg by mouth Daily., Disp: , Rfl:   •  lisinopril (PRINIVIL,ZESTRIL) 40 MG tablet, Take 40 mg by mouth Daily., Disp: , Rfl:           ROS  Review of Systems   Cardiovascular: Positive for irregular heartbeat, near-syncope and syncope. Negative for chest pain and dyspnea on exertion.   Respiratory: Negative for shortness of breath.          SOCIAL HX  Social History     Socioeconomic History   • Marital status: Single     Spouse name: Not on file   • Number of children: Not on file   • Years of education: Not on file   • Highest education level: Not on file   Tobacco Use   • Smoking status: Never Smoker   • Smokeless tobacco: Never Used   Substance and Sexual Activity   • Alcohol use: No     Frequency: Never   • Drug use: No   • Sexual activity: Defer       FAMILY HX  Family History   Problem Relation Age of Onset   • Hypertension Mother    • Heart failure Father    • Cardiomyopathy Sister    • Heart attack Sister    • Diabetes Sister    • Stroke Brother    • Heart attack Brother    • Hypertension Brother    • Hyperlipidemia Brother    • Stroke Brother    •  Hyperlipidemia Brother    • Hypertension Brother    • Hypertension Sister    • Hypertension Sister    • Hypertension Sister    • Hypertension Sister    • Hypertension Sister    • No Known Problems Sister    • No Known Problems Sister              Veto Atwood III, MD, FACC

## 2020-09-29 ENCOUNTER — TELEPHONE (OUTPATIENT)
Dept: CARDIOLOGY | Facility: CLINIC | Age: 50
End: 2020-09-29

## 2020-09-29 NOTE — TELEPHONE ENCOUNTER
AL  Had an episode last week where she fainted at home.Concerned because her appt with  is on not until 11/24/2020. Talked with Laurie. Appt moved up to 10/6/2020 at 0830. Patient aware of appt change and agreeable to that time.

## 2020-10-05 ENCOUNTER — HOSPITAL ENCOUNTER (OUTPATIENT)
Dept: CARDIOLOGY | Facility: HOSPITAL | Age: 50
Discharge: HOME | End: 2020-10-05
Admitting: INTERNAL MEDICINE

## 2020-10-05 DIAGNOSIS — R55 SYNCOPE AND COLLAPSE: ICD-10-CM

## 2020-10-05 LAB
BH CV ECHO MEAS - AO MAX PG (FULL): 0.8 MMHG
BH CV ECHO MEAS - AO MAX PG: 3 MMHG
BH CV ECHO MEAS - AO MEAN PG (FULL): 0.65 MMHG
BH CV ECHO MEAS - AO MEAN PG: 1.7 MMHG
BH CV ECHO MEAS - AO ROOT AREA (BSA CORRECTED): 1.6
BH CV ECHO MEAS - AO ROOT AREA: 7.2 CM^2
BH CV ECHO MEAS - AO ROOT DIAM: 3 CM
BH CV ECHO MEAS - AO V2 MAX: 86.9 CM/SEC
BH CV ECHO MEAS - AO V2 MEAN: 61.8 CM/SEC
BH CV ECHO MEAS - AO V2 VTI: 20 CM
BH CV ECHO MEAS - AVA(I,A): 2.2 CM^2
BH CV ECHO MEAS - AVA(I,D): 2.2 CM^2
BH CV ECHO MEAS - AVA(V,A): 2.7 CM^2
BH CV ECHO MEAS - AVA(V,D): 2.7 CM^2
BH CV ECHO MEAS - BSA(HAYCOCK): 2 M^2
BH CV ECHO MEAS - BSA: 1.9 M^2
BH CV ECHO MEAS - BZI_BMI: 28.3 KILOGRAMS/M^2
BH CV ECHO MEAS - BZI_METRIC_HEIGHT: 170.2 CM
BH CV ECHO MEAS - BZI_METRIC_WEIGHT: 82.1 KG
BH CV ECHO MEAS - EDV(CUBED): 109.3 ML
BH CV ECHO MEAS - EDV(MOD-SP2): 29 ML
BH CV ECHO MEAS - EDV(MOD-SP4): 40 ML
BH CV ECHO MEAS - EDV(TEICH): 106.5 ML
BH CV ECHO MEAS - EF(CUBED): 83.2 %
BH CV ECHO MEAS - EF(MOD-BP): 63 %
BH CV ECHO MEAS - EF(MOD-SP2): 55.2 %
BH CV ECHO MEAS - EF(MOD-SP4): 70 %
BH CV ECHO MEAS - EF(TEICH): 76.1 %
BH CV ECHO MEAS - ESV(CUBED): 18.4 ML
BH CV ECHO MEAS - ESV(MOD-SP2): 13 ML
BH CV ECHO MEAS - ESV(MOD-SP4): 12 ML
BH CV ECHO MEAS - ESV(TEICH): 25.5 ML
BH CV ECHO MEAS - FS: 44.8 %
BH CV ECHO MEAS - IVS/LVPW: 1
BH CV ECHO MEAS - IVSD: 0.88 CM
BH CV ECHO MEAS - LA DIMENSION: 3.3 CM
BH CV ECHO MEAS - LA/AO: 1.1
BH CV ECHO MEAS - LAD MAJOR: 4.6 CM
BH CV ECHO MEAS - LAT PEAK E' VEL: 6.7 CM/SEC
BH CV ECHO MEAS - LATERAL E/E' RATIO: 9.2
BH CV ECHO MEAS - LV DIASTOLIC VOL/BSA (35-75): 20.6 ML/M^2
BH CV ECHO MEAS - LV IVRT: 0.17 SEC
BH CV ECHO MEAS - LV MASS(C)D: 140.3 GRAMS
BH CV ECHO MEAS - LV MASS(C)DI: 72.4 GRAMS/M^2
BH CV ECHO MEAS - LV MAX PG: 2.2 MMHG
BH CV ECHO MEAS - LV MEAN PG: 1.1 MMHG
BH CV ECHO MEAS - LV SYSTOLIC VOL/BSA (12-30): 6.2 ML/M^2
BH CV ECHO MEAS - LV V1 MAX: 74.5 CM/SEC
BH CV ECHO MEAS - LV V1 MEAN: 47 CM/SEC
BH CV ECHO MEAS - LV V1 VTI: 13.7 CM
BH CV ECHO MEAS - LVIDD: 4.8 CM
BH CV ECHO MEAS - LVIDS: 2.6 CM
BH CV ECHO MEAS - LVLD AP2: 5.9 CM
BH CV ECHO MEAS - LVLD AP4: 6.1 CM
BH CV ECHO MEAS - LVLS AP2: 4.2 CM
BH CV ECHO MEAS - LVLS AP4: 4.7 CM
BH CV ECHO MEAS - LVOT AREA (M): 3.1 CM^2
BH CV ECHO MEAS - LVOT AREA: 3.1 CM^2
BH CV ECHO MEAS - LVOT DIAM: 2 CM
BH CV ECHO MEAS - LVPWD: 0.86 CM
BH CV ECHO MEAS - MED PEAK E' VEL: 9.5 CM/SEC
BH CV ECHO MEAS - MEDIAL E/E' RATIO: 6.5
BH CV ECHO MEAS - MV A MAX VEL: 56.8 CM/SEC
BH CV ECHO MEAS - MV DEC SLOPE: 436.8 CM/SEC^2
BH CV ECHO MEAS - MV DEC TIME: 0.19 SEC
BH CV ECHO MEAS - MV E MAX VEL: 63.2 CM/SEC
BH CV ECHO MEAS - MV E/A: 1.1
BH CV ECHO MEAS - MV MAX PG: 2.9 MMHG
BH CV ECHO MEAS - MV MEAN PG: 1.1 MMHG
BH CV ECHO MEAS - MV P1/2T MAX VEL: 84.2 CM/SEC
BH CV ECHO MEAS - MV P1/2T: 56.4 MSEC
BH CV ECHO MEAS - MV V2 MAX: 85.5 CM/SEC
BH CV ECHO MEAS - MV V2 MEAN: 50 CM/SEC
BH CV ECHO MEAS - MV V2 VTI: 19.7 CM
BH CV ECHO MEAS - MVA P1/2T LCG: 2.6 CM^2
BH CV ECHO MEAS - MVA(P1/2T): 3.9 CM^2
BH CV ECHO MEAS - MVA(VTI): 2.2 CM^2
BH CV ECHO MEAS - PA ACC SLOPE: 371.5 CM/SEC^2
BH CV ECHO MEAS - PA ACC TIME: 0.14 SEC
BH CV ECHO MEAS - PA PR(ACCEL): 15.6 MMHG
BH CV ECHO MEAS - RAP SYSTOLE: 3 MMHG
BH CV ECHO MEAS - RV MAX PG: 0.75 MMHG
BH CV ECHO MEAS - RV V1 MAX: 43.4 CM/SEC
BH CV ECHO MEAS - RVSP: 14 MMHG
BH CV ECHO MEAS - SI(AO): 74 ML/M^2
BH CV ECHO MEAS - SI(CUBED): 46.9 ML/M^2
BH CV ECHO MEAS - SI(LVOT): 22.2 ML/M^2
BH CV ECHO MEAS - SI(MOD-SP2): 8.3 ML/M^2
BH CV ECHO MEAS - SI(MOD-SP4): 14.4 ML/M^2
BH CV ECHO MEAS - SI(TEICH): 41.8 ML/M^2
BH CV ECHO MEAS - SV(AO): 143.4 ML
BH CV ECHO MEAS - SV(CUBED): 90.9 ML
BH CV ECHO MEAS - SV(LVOT): 43.1 ML
BH CV ECHO MEAS - SV(MOD-SP2): 16 ML
BH CV ECHO MEAS - SV(MOD-SP4): 28 ML
BH CV ECHO MEAS - SV(TEICH): 81 ML
BH CV ECHO MEAS - TAPSE (>1.6): 1.7 CM
BH CV ECHO MEAS - TR MAX PG: 11 MMHG
BH CV ECHO MEAS - TR MAX VEL: 162.5 CM/SEC
BH CV ECHO MEASUREMENTS AVERAGE E/E' RATIO: 7.8
BH CV XLRA - RV BASE: 3.2 CM
BH CV XLRA - RV LENGTH: 7.5 CM
BH CV XLRA - RV MID: 2.7 CM
BH CV XLRA - TDI S': 11.4 CM/SEC
LEFT ATRIUM VOLUME INDEX: 21.7 ML/M^2
LEFT ATRIUM VOLUME: 42 ML
MAXIMAL PREDICTED HEART RATE: 168 BPM
STRESS TARGET HR: 143 BPM

## 2020-10-05 PROCEDURE — 93306 TTE W/DOPPLER COMPLETE: CPT | Performed by: INTERNAL MEDICINE

## 2020-10-05 PROCEDURE — 93306 TTE W/DOPPLER COMPLETE: CPT

## 2020-10-06 ENCOUNTER — TELEPHONE (OUTPATIENT)
Dept: CARDIOLOGY | Facility: CLINIC | Age: 50
End: 2020-10-06

## 2020-10-06 ENCOUNTER — CONSULT (OUTPATIENT)
Dept: CARDIOLOGY | Facility: CLINIC | Age: 50
End: 2020-10-06

## 2020-10-06 VITALS
WEIGHT: 187.2 LBS | OXYGEN SATURATION: 95 % | HEIGHT: 68 IN | BODY MASS INDEX: 28.37 KG/M2 | DIASTOLIC BLOOD PRESSURE: 82 MMHG | HEART RATE: 68 BPM | SYSTOLIC BLOOD PRESSURE: 110 MMHG

## 2020-10-06 DIAGNOSIS — R55 SYNCOPE AND COLLAPSE: Primary | ICD-10-CM

## 2020-10-06 DIAGNOSIS — I44.2 AV BLOCK, COMPLETE (HCC): ICD-10-CM

## 2020-10-06 PROCEDURE — 99204 OFFICE O/P NEW MOD 45 MIN: CPT | Performed by: INTERNAL MEDICINE

## 2020-10-06 PROCEDURE — 93000 ELECTROCARDIOGRAM COMPLETE: CPT | Performed by: INTERNAL MEDICINE

## 2020-10-06 RX ORDER — SWAB
SWAB, NON-MEDICATED MISCELLANEOUS DAILY
COMMUNITY

## 2020-10-06 NOTE — PROGRESS NOTES
Yamilet Allan  1970  PCP: Jaimee Bobo MD    SUBJECTIVE:   Yamilet Allan is a 50 y.o. female seen for a consultation visit regarding the following:     Chief Complaint:   Chief Complaint   Patient presents with   • Loss of Consciousness     Consult           Consultation is requested by No ref. provider found for evaluation of Loss of Consciousness (Consult )        History:  This is a 50-year-old patient referred by Dr. Veto Atwood for further evaluation management of syncope with associated complete AV block.  The patient reports that she has had issues since October 2015 when she had a cerebral vascular accident.  She is had great fluctuation in her heart rate over the years.  She reports that she now is experiencing tachybradycardia syndrome on a daily basis.  She also had episodes of near syncope where she will have sudden near transient loss of consciousness.  She has implantable loop recorder that is documented transient complete AV block.      Cardiac PMH: (Old records have been reviewed and summarized below)  1.  CVA-October 2015  2.  Tachybradycardia syndrome  3.  Near syncope  4.  Medtronic loop implant  5.  Documented transient complete AV block  6.  Echo-October 2020-normal ejection fraction    Past Medical History, Past Surgical History, Family history, Social History, and Medications were all reviewed with the patient today and updated as necessary.       Current Outpatient Medications:   •  albuterol (ACCUNEB) 0.63 MG/3ML nebulizer solution, Take 3 mL by nebulization Every 6 (Six) Hours As Needed for Wheezing., Disp: 3 mL, Rfl: 0  •  EPINEPHrine (EPIPEN) 0.3 MG/0.3ML solution auto-injector injection, As Needed., Disp: , Rfl:   •  escitalopram (LEXAPRO) 20 MG tablet, Take 40 mg by mouth Daily., Disp: , Rfl:   •  lisinopril (PRINIVIL,ZESTRIL) 40 MG tablet, Take 40 mg by mouth Daily., Disp: , Rfl:   •  Prenatal 28-0.8 MG tablet, Take  by mouth Daily., Disp: , Rfl:     Allergies   Allergen  Reactions   • Bee Venom Anaphylaxis   • Cat Hair Extract Anaphylaxis   • Molds & Smuts Anaphylaxis   • Penicillins Anaphylaxis         Past Medical History:   Diagnosis Date   • Anxiety and depression    • Asthma    • Cardiomyopathy (CMS/HCC)    • Hyperlipidemia    • Hypertension    • Menopause    • Stroke (CMS/HCC)      Past Surgical History:   Procedure Laterality Date   • ADENOIDECTOMY     •  SECTION     • COLONOSCOPY     • OTHER SURGICAL HISTORY      LOOP RECORDER   • RHINOPLASTY     • TONSILLECTOMY       Family History   Problem Relation Age of Onset   • Hypertension Mother    • Heart failure Father    • Cardiomyopathy Sister    • Heart attack Sister    • Diabetes Sister    • Stroke Brother    • Heart attack Brother    • Hypertension Brother    • Hyperlipidemia Brother    • Stroke Brother    • Hyperlipidemia Brother    • Hypertension Brother    • Hypertension Sister    • Hypertension Sister    • Hypertension Sister    • Hypertension Sister    • Hypertension Sister    • No Known Problems Sister    • No Known Problems Sister      Social History     Tobacco Use   • Smoking status: Never Smoker   • Smokeless tobacco: Never Used   Substance Use Topics   • Alcohol use: No     Frequency: Never       ROS:  Review of Systems:  General: no recent weight loss/gain, weakness or fatigue  Skin: no rashes, lumps, or other skin changes  HEENT: no dizziness, lightheadedness, or vision changes  Respiratory: no cough or hemoptysis  Cardiovascular: + palpitations, and tachycardia  Gastrointestinal: no black/tarry stools or diarrhea  Urinary: no change in frequency or urgency  Peripheral Vascular: no claudication or leg cramps  Musculoskeletal: no muscle or joint pain/stiffness  Psychiatric: no depression or excessive stress  Neurological: + syncope  Hematologic: no anemia, easy bruising or bleeding  Endocrine: no thyroid problems, nor heat or cold intolerance    PHYSICAL EXAM:   /82 (BP Location: Right arm, Patient  "Position: Sitting)   Pulse 68   Ht 171.5 cm (67.5\")   Wt 84.9 kg (187 lb 3.2 oz)   SpO2 95%   BMI 28.89 kg/m²      Wt Readings from Last 5 Encounters:   10/06/20 84.9 kg (187 lb 3.2 oz)   09/08/20 82.1 kg (181 lb)   01/14/20 87.5 kg (193 lb)   10/22/19 73.5 kg (162 lb)     BP Readings from Last 5 Encounters:   10/06/20 110/82   09/08/20 114/76   01/14/20 122/86   10/22/19 122/84       General-Well Nourished, Well developed  Eyes - PERRLA  Neck- supple, No mass  CV- regular rate and rhythm, no MRG  Lung- clear bilaterally  Abd- soft, +BS  Musc/skel - Norm strength and range of motion  Skin- warm and dry  Neuro - Alert & Oriented x 3, appropriate mood.    Medical problems and test results were reviewed with the patient today.     Results for orders placed or performed during the hospital encounter of 10/05/20   Adult Transthoracic Echo Complete W/ Cont if Necessary Per Protocol   Result Value Ref Range    BSA 1.9 m^2    IVSd 0.88 cm    LVIDd 4.8 cm    LVIDs 2.6 cm    LVPWd 0.86 cm    IVS/LVPW 1.0     FS 44.8 %    EDV(Teich) 106.5 ml    ESV(Teich) 25.5 ml    EF(Teich) 76.1 %    EDV(cubed) 109.3 ml    ESV(cubed) 18.4 ml    EF(cubed) 83.2 %    LV mass(C)d 140.3 grams    LV mass(C)dI 72.4 grams/m^2    SV(Teich) 81.0 ml    SI(Teich) 41.8 ml/m^2    SV(cubed) 90.9 ml    SI(cubed) 46.9 ml/m^2    Ao root diam 3.0 cm    Ao root area 7.2 cm^2    LA dimension 3.3 cm    LA/Ao 1.1     LVOT diam 2.0 cm    LVOT area 3.1 cm^2    LVOT area(traced) 3.1 cm^2    LAd major 4.6 cm    LVLd ap4 6.1 cm    EDV(MOD-sp4) 40.0 ml    LVLs ap4 4.7 cm    ESV(MOD-sp4) 12.0 ml    EF(MOD-sp4) 70.0 %    LVLd ap2 5.9 cm    EDV(MOD-sp2) 29.0 ml    LVLs ap2 4.2 cm    ESV(MOD-sp2) 13.0 ml    EF(MOD-sp2) 55.2 %    LA volume 42.0 ml    EF(MOD-bp) 63.0 %    SV(MOD-sp4) 28.0 ml    SI(MOD-sp4) 14.4 ml/m^2    SV(MOD-sp2) 16.0 ml    SI(MOD-sp2) 8.3 ml/m^2    Ao root area (BSA corrected) 1.6     LV Ramírez Vol (BSA corrected) 20.6 ml/m^2    LV Sys Vol (BSA " corrected) 6.2 ml/m^2    LA Volume Index 21.7 ml/m^2    MV E max stan 63.2 cm/sec    MV A max stan 56.8 cm/sec    MV E/A 1.1     LV IVRT 0.17 sec    MV V2 max 85.5 cm/sec    MV max PG 2.9 mmHg    MV V2 mean 50.0 cm/sec    MV mean PG 1.1 mmHg    MV V2 VTI 19.7 cm    MVA(VTI) 2.2 cm^2    MV P1/2t max stan 84.2 cm/sec    MV P1/2t 56.4 msec    MVA(P1/2t) 3.9 cm^2    MV dec slope 436.8 cm/sec^2    MV dec time 0.19 sec    Ao pk stan 86.9 cm/sec    Ao max PG 3.0 mmHg    Ao max PG (full) 0.8 mmHg    Ao V2 mean 61.8 cm/sec    Ao mean PG 1.7 mmHg    Ao mean PG (full) 0.65 mmHg    Ao V2 VTI 20.0 cm    LINNETTE(I,A) 2.2 cm^2    LINNETTE(I,D) 2.2 cm^2    LINNETTE(V,A) 2.7 cm^2    LINNETTE(V,D) 2.7 cm^2    LV V1 max PG 2.2 mmHg    LV V1 mean PG 1.1 mmHg    LV V1 max 74.5 cm/sec    LV V1 mean 47.0 cm/sec    LV V1 VTI 13.7 cm    SV(Ao) 143.4 ml    SI(Ao) 74.0 ml/m^2    SV(LVOT) 43.1 ml    SI(LVOT) 22.2 ml/m^2    PA acc slope 371.5 cm/sec^2    PA acc time 0.14 sec    RV V1 max PG 0.75 mmHg    RV V1 max 43.4 cm/sec    TR max stan 162.5 cm/sec    TR max PG 11.0 mmHg    RVSP(TR) 14.0 mmHg    RAP systole 3.0 mmHg    PA pr(Accel) 15.6 mmHg    MVA P1/2T LCG 2.6 cm^2    Lat E/e'  9.2     Med E/e' 6.5     Lat Peak E' Stan 6.7 cm/sec    Med Peak E' Stan 9.5 cm/sec     CV ECHO SELAM - BZI_BMI 28.3 kilograms/m^2     CV ECHO SELAM - BSA(HAYCOCK) 2.0 m^2     CV ECHO SELAM - BZI_METRIC_WEIGHT 82.1 kg     CV ECHO SELAM - BZI_METRIC_HEIGHT 170.2 cm    Avg E/e' ratio 7.80     Target HR (85%) 143 bpm    Max. Pred. HR (100%) 168 bpm    RV S' 11.40 cm/sec    RV Base 3.20 cm    RV Length 7.50 cm    RV Mid 2.70 cm    TAPSE (>1.6) 1.70 cm         No results found for: CHOL, HDL, HDLC, LDL, LDLC, VLDL    EKG:  (EKG/Tracing has been independently visualized by me and summarized below)      ECG 12 Lead    Date/Time: 10/6/2020 12:49 PM  Performed by: Lewis Man MD  Authorized by: Lewis Man MD   Comparison: compared with previous ECG   Similar to previous  ECG  Rhythm: sinus rhythm  Rate: normal  QRS axis: normal  Other findings: poor R wave progression    Clinical impression: abnormal EKG            ASSESSMENT and PLAN  1.  Transient AV block-has associated symptomatic bradycardia.  We will plan for dual-chamber pacemaker implantation. We discussed the procedure in great detail including risks, benefits, and alternatives. The patient understands that risks include bleeding, infection, stroke, MI, cardiac perforation, and even death.  No clear etiology at this time of the transient AV block.  May consider cardiac MRI in the future for further evaluation.  2.  Tachybradycardia syndrome-consider the addition of a low-dose beta-blocker post device implantation  3.  Syncope-appears to be associated with transient complete AV block-plan for a dual-chamber pacemaker.  We have asked her to restrict her driving until pacemaker.     Return for after procedure.    1. Pacemaker - [place] DDDR  2. No Meds to Hold  3. Pre-op with Vancomycin.         Leiws Man M.D., F.A.C.C, F.H.R.S.  Cardiology/Electrophysiology  10/06/20  12:50 EDT

## 2020-10-06 NOTE — TELEPHONE ENCOUNTER
----- Message from Veto Atwood III, MD sent at 10/6/2020  9:25 AM EDT -----  Normal heart function on her echo, everything looks good.

## 2020-10-07 ENCOUNTER — HOSPITAL ENCOUNTER (INPATIENT)
Facility: HOSPITAL | Age: 50
LOS: 2 days | Discharge: HOME | End: 2020-10-09
Attending: EMERGENCY MEDICINE | Admitting: INTERNAL MEDICINE

## 2020-10-07 DIAGNOSIS — I44.2 AV BLOCK, COMPLETE (HCC): ICD-10-CM

## 2020-10-07 DIAGNOSIS — I44.30 AV BLOCK: ICD-10-CM

## 2020-10-07 DIAGNOSIS — R55 SYNCOPE, UNSPECIFIED SYNCOPE TYPE: Primary | ICD-10-CM

## 2020-10-07 PROBLEM — E78.5 HLD (HYPERLIPIDEMIA): Status: ACTIVE | Noted: 2020-10-07

## 2020-10-07 PROBLEM — I42.9 CARDIOMYOPATHY (HCC): Status: ACTIVE | Noted: 2020-10-07

## 2020-10-07 LAB
ALBUMIN SERPL-MCNC: 4.6 G/DL (ref 3.5–5.2)
ALBUMIN/GLOB SERPL: 1.8 G/DL
ALP SERPL-CCNC: 78 U/L (ref 39–117)
ALT SERPL W P-5'-P-CCNC: 31 U/L (ref 1–33)
ANION GAP SERPL CALCULATED.3IONS-SCNC: 9 MMOL/L (ref 5–15)
AST SERPL-CCNC: 24 U/L (ref 1–32)
BASOPHILS # BLD AUTO: 0.06 10*3/MM3 (ref 0–0.2)
BASOPHILS NFR BLD AUTO: 0.7 % (ref 0–1.5)
BILIRUB SERPL-MCNC: 0.2 MG/DL (ref 0–1.2)
BUN SERPL-MCNC: 13 MG/DL (ref 6–20)
BUN/CREAT SERPL: 15.5 (ref 7–25)
CALCIUM SPEC-SCNC: 9.5 MG/DL (ref 8.6–10.5)
CHLORIDE SERPL-SCNC: 102 MMOL/L (ref 98–107)
CO2 SERPL-SCNC: 29 MMOL/L (ref 22–29)
CREAT SERPL-MCNC: 0.84 MG/DL (ref 0.57–1)
DEPRECATED RDW RBC AUTO: 44 FL (ref 37–54)
EOSINOPHIL # BLD AUTO: 0.3 10*3/MM3 (ref 0–0.4)
EOSINOPHIL NFR BLD AUTO: 3.6 % (ref 0.3–6.2)
ERYTHROCYTE [DISTWIDTH] IN BLOOD BY AUTOMATED COUNT: 12.9 % (ref 12.3–15.4)
GFR SERPL CREATININE-BSD FRML MDRD: 71 ML/MIN/1.73
GLOBULIN UR ELPH-MCNC: 2.6 GM/DL
GLUCOSE SERPL-MCNC: 92 MG/DL (ref 65–99)
HCT VFR BLD AUTO: 41.8 % (ref 34–46.6)
HGB BLD-MCNC: 13.5 G/DL (ref 12–15.9)
IMM GRANULOCYTES # BLD AUTO: 0.04 10*3/MM3 (ref 0–0.05)
IMM GRANULOCYTES NFR BLD AUTO: 0.5 % (ref 0–0.5)
INR PPP: 0.99 (ref 0.85–1.16)
LYMPHOCYTES # BLD AUTO: 3.05 10*3/MM3 (ref 0.7–3.1)
LYMPHOCYTES NFR BLD AUTO: 36.6 % (ref 19.6–45.3)
MAGNESIUM SERPL-MCNC: 2.2 MG/DL (ref 1.6–2.6)
MCH RBC QN AUTO: 30 PG (ref 26.6–33)
MCHC RBC AUTO-ENTMCNC: 32.3 G/DL (ref 31.5–35.7)
MCV RBC AUTO: 92.9 FL (ref 79–97)
MONOCYTES # BLD AUTO: 0.73 10*3/MM3 (ref 0.1–0.9)
MONOCYTES NFR BLD AUTO: 8.8 % (ref 5–12)
NEUTROPHILS NFR BLD AUTO: 4.16 10*3/MM3 (ref 1.7–7)
NEUTROPHILS NFR BLD AUTO: 49.8 % (ref 42.7–76)
NRBC BLD AUTO-RTO: 0 /100 WBC (ref 0–0.2)
PLATELET # BLD AUTO: 278 10*3/MM3 (ref 140–450)
PMV BLD AUTO: 10 FL (ref 6–12)
POTASSIUM SERPL-SCNC: 3.8 MMOL/L (ref 3.5–5.2)
PROT SERPL-MCNC: 7.2 G/DL (ref 6–8.5)
PROTHROMBIN TIME: 12.8 SECONDS (ref 11.5–14)
RBC # BLD AUTO: 4.5 10*6/MM3 (ref 3.77–5.28)
SODIUM SERPL-SCNC: 140 MMOL/L (ref 136–145)
T4 FREE SERPL-MCNC: 0.82 NG/DL (ref 0.93–1.7)
TROPONIN T SERPL-MCNC: <0.01 NG/ML (ref 0–0.03)
TSH SERPL DL<=0.05 MIU/L-ACNC: 3.76 UIU/ML (ref 0.27–4.2)
WBC # BLD AUTO: 8.34 10*3/MM3 (ref 3.4–10.8)

## 2020-10-07 PROCEDURE — 83735 ASSAY OF MAGNESIUM: CPT | Performed by: EMERGENCY MEDICINE

## 2020-10-07 PROCEDURE — 99284 EMERGENCY DEPT VISIT MOD MDM: CPT

## 2020-10-07 PROCEDURE — 99223 1ST HOSP IP/OBS HIGH 75: CPT | Performed by: INTERNAL MEDICINE

## 2020-10-07 PROCEDURE — 84484 ASSAY OF TROPONIN QUANT: CPT | Performed by: EMERGENCY MEDICINE

## 2020-10-07 PROCEDURE — 25010000002 METOCLOPRAMIDE PER 10 MG: Performed by: EMERGENCY MEDICINE

## 2020-10-07 PROCEDURE — 85610 PROTHROMBIN TIME: CPT | Performed by: EMERGENCY MEDICINE

## 2020-10-07 PROCEDURE — 93005 ELECTROCARDIOGRAM TRACING: CPT | Performed by: EMERGENCY MEDICINE

## 2020-10-07 PROCEDURE — 84439 ASSAY OF FREE THYROXINE: CPT | Performed by: EMERGENCY MEDICINE

## 2020-10-07 PROCEDURE — 81003 URINALYSIS AUTO W/O SCOPE: CPT | Performed by: EMERGENCY MEDICINE

## 2020-10-07 PROCEDURE — C9803 HOPD COVID-19 SPEC COLLECT: HCPCS | Performed by: INTERNAL MEDICINE

## 2020-10-07 PROCEDURE — 80050 GENERAL HEALTH PANEL: CPT | Performed by: EMERGENCY MEDICINE

## 2020-10-07 PROCEDURE — 0202U NFCT DS 22 TRGT SARS-COV-2: CPT | Performed by: INTERNAL MEDICINE

## 2020-10-07 RX ORDER — SODIUM CHLORIDE 0.9 % (FLUSH) 0.9 %
10 SYRINGE (ML) INJECTION AS NEEDED
Status: DISCONTINUED | OUTPATIENT
Start: 2020-10-07 | End: 2020-10-09 | Stop reason: HOSPADM

## 2020-10-07 RX ORDER — METOCLOPRAMIDE HYDROCHLORIDE 5 MG/ML
5 INJECTION INTRAMUSCULAR; INTRAVENOUS ONCE
Status: COMPLETED | OUTPATIENT
Start: 2020-10-07 | End: 2020-10-07

## 2020-10-07 RX ADMIN — METOCLOPRAMIDE 5 MG: 5 INJECTION, SOLUTION INTRAMUSCULAR; INTRAVENOUS at 23:23

## 2020-10-07 RX ADMIN — SODIUM CHLORIDE 500 ML: 9 INJECTION, SOLUTION INTRAVENOUS at 22:24

## 2020-10-08 ENCOUNTER — APPOINTMENT (OUTPATIENT)
Dept: GENERAL RADIOLOGY | Facility: HOSPITAL | Age: 50
End: 2020-10-08

## 2020-10-08 LAB
ANION GAP SERPL CALCULATED.3IONS-SCNC: 12 MMOL/L (ref 5–15)
B PARAPERT DNA SPEC QL NAA+PROBE: NOT DETECTED
B PERT DNA SPEC QL NAA+PROBE: NOT DETECTED
BASOPHILS # BLD AUTO: 0.05 10*3/MM3 (ref 0–0.2)
BASOPHILS NFR BLD AUTO: 0.7 % (ref 0–1.5)
BILIRUB UR QL STRIP: NEGATIVE
BUN SERPL-MCNC: 13 MG/DL (ref 6–20)
BUN/CREAT SERPL: 15.5 (ref 7–25)
C PNEUM DNA NPH QL NAA+NON-PROBE: NOT DETECTED
CALCIUM SPEC-SCNC: 8.8 MG/DL (ref 8.6–10.5)
CHLORIDE SERPL-SCNC: 103 MMOL/L (ref 98–107)
CLARITY UR: CLEAR
CO2 SERPL-SCNC: 23 MMOL/L (ref 22–29)
COLOR UR: YELLOW
CREAT SERPL-MCNC: 0.84 MG/DL (ref 0.57–1)
DEPRECATED RDW RBC AUTO: 44.9 FL (ref 37–54)
EOSINOPHIL # BLD AUTO: 0.32 10*3/MM3 (ref 0–0.4)
EOSINOPHIL NFR BLD AUTO: 4.6 % (ref 0.3–6.2)
ERYTHROCYTE [DISTWIDTH] IN BLOOD BY AUTOMATED COUNT: 13 % (ref 12.3–15.4)
FLUAV H1 2009 PAND RNA NPH QL NAA+PROBE: NOT DETECTED
FLUAV H1 HA GENE NPH QL NAA+PROBE: NOT DETECTED
FLUAV H3 RNA NPH QL NAA+PROBE: NOT DETECTED
FLUAV SUBTYP SPEC NAA+PROBE: NOT DETECTED
FLUBV RNA ISLT QL NAA+PROBE: NOT DETECTED
GFR SERPL CREATININE-BSD FRML MDRD: 71 ML/MIN/1.73
GLUCOSE SERPL-MCNC: 89 MG/DL (ref 65–99)
GLUCOSE UR STRIP-MCNC: NEGATIVE MG/DL
HADV DNA SPEC NAA+PROBE: NOT DETECTED
HCOV 229E RNA SPEC QL NAA+PROBE: NOT DETECTED
HCOV HKU1 RNA SPEC QL NAA+PROBE: NOT DETECTED
HCOV NL63 RNA SPEC QL NAA+PROBE: NOT DETECTED
HCOV OC43 RNA SPEC QL NAA+PROBE: NOT DETECTED
HCT VFR BLD AUTO: 40.3 % (ref 34–46.6)
HGB BLD-MCNC: 12.7 G/DL (ref 12–15.9)
HGB UR QL STRIP.AUTO: NEGATIVE
HMPV RNA NPH QL NAA+NON-PROBE: NOT DETECTED
HPIV1 RNA SPEC QL NAA+PROBE: NOT DETECTED
HPIV2 RNA SPEC QL NAA+PROBE: NOT DETECTED
HPIV3 RNA NPH QL NAA+PROBE: NOT DETECTED
HPIV4 P GENE NPH QL NAA+PROBE: NOT DETECTED
IMM GRANULOCYTES # BLD AUTO: 0.03 10*3/MM3 (ref 0–0.05)
IMM GRANULOCYTES NFR BLD AUTO: 0.4 % (ref 0–0.5)
KETONES UR QL STRIP: NEGATIVE
LEUKOCYTE ESTERASE UR QL STRIP.AUTO: NEGATIVE
LYMPHOCYTES # BLD AUTO: 2.55 10*3/MM3 (ref 0.7–3.1)
LYMPHOCYTES NFR BLD AUTO: 37 % (ref 19.6–45.3)
M PNEUMO IGG SER IA-ACNC: NOT DETECTED
MCH RBC QN AUTO: 30 PG (ref 26.6–33)
MCHC RBC AUTO-ENTMCNC: 31.5 G/DL (ref 31.5–35.7)
MCV RBC AUTO: 95 FL (ref 79–97)
MONOCYTES # BLD AUTO: 0.77 10*3/MM3 (ref 0.1–0.9)
MONOCYTES NFR BLD AUTO: 11.2 % (ref 5–12)
NEUTROPHILS NFR BLD AUTO: 3.18 10*3/MM3 (ref 1.7–7)
NEUTROPHILS NFR BLD AUTO: 46.1 % (ref 42.7–76)
NITRITE UR QL STRIP: NEGATIVE
NRBC BLD AUTO-RTO: 0 /100 WBC (ref 0–0.2)
PH UR STRIP.AUTO: 5.5 [PH] (ref 5–8)
PLATELET # BLD AUTO: 246 10*3/MM3 (ref 140–450)
PMV BLD AUTO: 10.1 FL (ref 6–12)
POTASSIUM SERPL-SCNC: 4.2 MMOL/L (ref 3.5–5.2)
PROT UR QL STRIP: NEGATIVE
RBC # BLD AUTO: 4.24 10*6/MM3 (ref 3.77–5.28)
RHINOVIRUS RNA SPEC NAA+PROBE: NOT DETECTED
RSV RNA NPH QL NAA+NON-PROBE: NOT DETECTED
SARS-COV-2 RNA NPH QL NAA+NON-PROBE: NOT DETECTED
SODIUM SERPL-SCNC: 138 MMOL/L (ref 136–145)
SP GR UR STRIP: 1.01 (ref 1–1.03)
TROPONIN T SERPL-MCNC: <0.01 NG/ML (ref 0–0.03)
TROPONIN T SERPL-MCNC: <0.01 NG/ML (ref 0–0.03)
UROBILINOGEN UR QL STRIP: NORMAL
WBC # BLD AUTO: 6.9 10*3/MM3 (ref 3.4–10.8)

## 2020-10-08 PROCEDURE — 25010000002 FENTANYL CITRATE (PF) 100 MCG/2ML SOLUTION: Performed by: INTERNAL MEDICINE

## 2020-10-08 PROCEDURE — 25010000002 EPINEPHRINE PER 0.1 MG: Performed by: INTERNAL MEDICINE

## 2020-10-08 PROCEDURE — 25010000002 VANCOMYCIN: Performed by: INTERNAL MEDICINE

## 2020-10-08 PROCEDURE — 25010000002 MIDAZOLAM PER 1 MG: Performed by: INTERNAL MEDICINE

## 2020-10-08 PROCEDURE — 25010000002 DIPHENHYDRAMINE PER 50 MG: Performed by: NURSE PRACTITIONER

## 2020-10-08 PROCEDURE — 33208 INSRT HEART PM ATRIAL & VENT: CPT | Performed by: INTERNAL MEDICINE

## 2020-10-08 PROCEDURE — 0JH606Z INSERTION OF PACEMAKER, DUAL CHAMBER INTO CHEST SUBCUTANEOUS TISSUE AND FASCIA, OPEN APPROACH: ICD-10-PCS | Performed by: INTERNAL MEDICINE

## 2020-10-08 PROCEDURE — 85025 COMPLETE CBC W/AUTO DIFF WBC: CPT | Performed by: NURSE PRACTITIONER

## 2020-10-08 PROCEDURE — 99152 MOD SED SAME PHYS/QHP 5/>YRS: CPT | Performed by: INTERNAL MEDICINE

## 2020-10-08 PROCEDURE — 99233 SBSQ HOSP IP/OBS HIGH 50: CPT | Performed by: INTERNAL MEDICINE

## 2020-10-08 PROCEDURE — C1785 PMKR, DUAL, RATE-RESP: HCPCS | Performed by: INTERNAL MEDICINE

## 2020-10-08 PROCEDURE — 02HK3JZ INSERTION OF PACEMAKER LEAD INTO RIGHT VENTRICLE, PERCUTANEOUS APPROACH: ICD-10-PCS | Performed by: INTERNAL MEDICINE

## 2020-10-08 PROCEDURE — C1892 INTRO/SHEATH,FIXED,PEEL-AWAY: HCPCS | Performed by: INTERNAL MEDICINE

## 2020-10-08 PROCEDURE — 84484 ASSAY OF TROPONIN QUANT: CPT | Performed by: INTERNAL MEDICINE

## 2020-10-08 PROCEDURE — 0JPT32Z REMOVAL OF MONITORING DEVICE FROM TRUNK SUBCUTANEOUS TISSUE AND FASCIA, PERCUTANEOUS APPROACH: ICD-10-PCS | Performed by: INTERNAL MEDICINE

## 2020-10-08 PROCEDURE — 25010000002 METHYLPREDNISOLONE PER 125 MG: Performed by: NURSE PRACTITIONER

## 2020-10-08 PROCEDURE — 93010 ELECTROCARDIOGRAM REPORT: CPT | Performed by: INTERNAL MEDICINE

## 2020-10-08 PROCEDURE — C1898 LEAD, PMKR, OTHER THAN TRANS: HCPCS | Performed by: INTERNAL MEDICINE

## 2020-10-08 PROCEDURE — 71045 X-RAY EXAM CHEST 1 VIEW: CPT

## 2020-10-08 PROCEDURE — 80048 BASIC METABOLIC PNL TOTAL CA: CPT | Performed by: NURSE PRACTITIONER

## 2020-10-08 PROCEDURE — 93005 ELECTROCARDIOGRAM TRACING: CPT | Performed by: INTERNAL MEDICINE

## 2020-10-08 PROCEDURE — 33286 RMVL SUBQ CAR RHYTHM MNTR: CPT | Performed by: INTERNAL MEDICINE

## 2020-10-08 PROCEDURE — 02H63JZ INSERTION OF PACEMAKER LEAD INTO RIGHT ATRIUM, PERCUTANEOUS APPROACH: ICD-10-PCS | Performed by: INTERNAL MEDICINE

## 2020-10-08 PROCEDURE — 25010000002 DIPHENHYDRAMINE PER 50 MG: Performed by: INTERNAL MEDICINE

## 2020-10-08 PROCEDURE — 93005 ELECTROCARDIOGRAM TRACING: CPT | Performed by: NURSE PRACTITIONER

## 2020-10-08 DEVICE — LD PM TENDRIL STS 6F52CM 2088TC52: Type: IMPLANTABLE DEVICE | Status: FUNCTIONAL

## 2020-10-08 DEVICE — LD PM TENDRIL STS 6F46CM 2088TC46: Type: IMPLANTABLE DEVICE | Status: FUNCTIONAL

## 2020-10-08 DEVICE — GEN PM ASSURITY MRI DR RF PM2272: Type: IMPLANTABLE DEVICE | Status: FUNCTIONAL

## 2020-10-08 RX ORDER — LISINOPRIL 40 MG/1
40 TABLET ORAL DAILY
Status: DISCONTINUED | OUTPATIENT
Start: 2020-10-08 | End: 2020-10-09 | Stop reason: HOSPADM

## 2020-10-08 RX ORDER — MIDAZOLAM HYDROCHLORIDE 1 MG/ML
INJECTION INTRAMUSCULAR; INTRAVENOUS AS NEEDED
Status: DISCONTINUED | OUTPATIENT
Start: 2020-10-08 | End: 2020-10-08 | Stop reason: HOSPADM

## 2020-10-08 RX ORDER — FENTANYL CITRATE 50 UG/ML
INJECTION, SOLUTION INTRAMUSCULAR; INTRAVENOUS AS NEEDED
Status: DISCONTINUED | OUTPATIENT
Start: 2020-10-08 | End: 2020-10-08 | Stop reason: HOSPADM

## 2020-10-08 RX ORDER — METHYLPREDNISOLONE SODIUM SUCCINATE 125 MG/2ML
125 INJECTION, POWDER, LYOPHILIZED, FOR SOLUTION INTRAMUSCULAR; INTRAVENOUS ONCE
Status: COMPLETED | OUTPATIENT
Start: 2020-10-08 | End: 2020-10-08

## 2020-10-08 RX ORDER — DIPHENHYDRAMINE HYDROCHLORIDE 50 MG/ML
25 INJECTION INTRAMUSCULAR; INTRAVENOUS EVERY 6 HOURS PRN
Status: DISCONTINUED | OUTPATIENT
Start: 2020-10-08 | End: 2020-10-08

## 2020-10-08 RX ORDER — DIPHENHYDRAMINE HYDROCHLORIDE 50 MG/ML
25 INJECTION INTRAMUSCULAR; INTRAVENOUS ONCE
Status: DISCONTINUED | OUTPATIENT
Start: 2020-10-08 | End: 2020-10-08

## 2020-10-08 RX ORDER — BUPIVACAINE HYDROCHLORIDE 5 MG/ML
INJECTION, SOLUTION EPIDURAL; INTRACAUDAL AS NEEDED
Status: DISCONTINUED | OUTPATIENT
Start: 2020-10-08 | End: 2020-10-08 | Stop reason: HOSPADM

## 2020-10-08 RX ORDER — EPINEPHRINE 1 MG/ML
INJECTION, SOLUTION, CONCENTRATE INTRAVENOUS
Status: DISPENSED
Start: 2020-10-08 | End: 2020-10-09

## 2020-10-08 RX ORDER — FAMOTIDINE 10 MG/ML
20 INJECTION, SOLUTION INTRAVENOUS EVERY 12 HOURS SCHEDULED
Status: DISCONTINUED | OUTPATIENT
Start: 2020-10-08 | End: 2020-10-09 | Stop reason: HOSPADM

## 2020-10-08 RX ORDER — ACETAMINOPHEN 325 MG/1
650 TABLET ORAL EVERY 4 HOURS PRN
Status: DISCONTINUED | OUTPATIENT
Start: 2020-10-08 | End: 2020-10-09 | Stop reason: HOSPADM

## 2020-10-08 RX ORDER — EPINEPHRINE 1 MG/ML
0.3 INJECTION, SOLUTION, CONCENTRATE INTRAVENOUS ONCE
Status: DISCONTINUED | OUTPATIENT
Start: 2020-10-08 | End: 2020-10-08

## 2020-10-08 RX ORDER — LIDOCAINE HYDROCHLORIDE AND EPINEPHRINE 10; 10 MG/ML; UG/ML
INJECTION, SOLUTION INFILTRATION; PERINEURAL AS NEEDED
Status: DISCONTINUED | OUTPATIENT
Start: 2020-10-08 | End: 2020-10-08 | Stop reason: HOSPADM

## 2020-10-08 RX ORDER — VANCOMYCIN HYDROCHLORIDE 1 G/200ML
1000 INJECTION, SOLUTION INTRAVENOUS
Status: CANCELLED | OUTPATIENT
Start: 2020-10-09 | End: 2020-10-10

## 2020-10-08 RX ORDER — ONDANSETRON 2 MG/ML
4 INJECTION INTRAMUSCULAR; INTRAVENOUS EVERY 6 HOURS PRN
Status: DISCONTINUED | OUTPATIENT
Start: 2020-10-08 | End: 2020-10-09 | Stop reason: HOSPADM

## 2020-10-08 RX ORDER — DIPHENHYDRAMINE HYDROCHLORIDE 50 MG/ML
25 INJECTION INTRAMUSCULAR; INTRAVENOUS ONCE
Status: COMPLETED | OUTPATIENT
Start: 2020-10-08 | End: 2020-10-08

## 2020-10-08 RX ORDER — SODIUM CHLORIDE 0.9 % (FLUSH) 0.9 %
10 SYRINGE (ML) INJECTION AS NEEDED
Status: DISCONTINUED | OUTPATIENT
Start: 2020-10-08 | End: 2020-10-09 | Stop reason: HOSPADM

## 2020-10-08 RX ORDER — SODIUM CHLORIDE 0.9 % (FLUSH) 0.9 %
10 SYRINGE (ML) INJECTION EVERY 12 HOURS SCHEDULED
Status: DISCONTINUED | OUTPATIENT
Start: 2020-10-08 | End: 2020-10-09 | Stop reason: HOSPADM

## 2020-10-08 RX ORDER — ESCITALOPRAM OXALATE 20 MG/1
40 TABLET ORAL DAILY
Status: DISCONTINUED | OUTPATIENT
Start: 2020-10-08 | End: 2020-10-09 | Stop reason: HOSPADM

## 2020-10-08 RX ORDER — EPINEPHRINE 1 MG/ML
0.3 INJECTION, SOLUTION, CONCENTRATE INTRAVENOUS ONCE
Status: COMPLETED | OUTPATIENT
Start: 2020-10-08 | End: 2020-10-08

## 2020-10-08 RX ORDER — ONDANSETRON 4 MG/1
4 TABLET, FILM COATED ORAL EVERY 6 HOURS PRN
Status: DISCONTINUED | OUTPATIENT
Start: 2020-10-08 | End: 2020-10-09 | Stop reason: HOSPADM

## 2020-10-08 RX ORDER — ACETAMINOPHEN 160 MG/5ML
650 SOLUTION ORAL EVERY 4 HOURS PRN
Status: DISCONTINUED | OUTPATIENT
Start: 2020-10-08 | End: 2020-10-09 | Stop reason: HOSPADM

## 2020-10-08 RX ORDER — METOPROLOL SUCCINATE 25 MG/1
25 TABLET, EXTENDED RELEASE ORAL DAILY
Qty: 30 TABLET | Refills: 11 | Status: SHIPPED | OUTPATIENT
Start: 2020-10-08 | End: 2020-10-19 | Stop reason: SDUPTHER

## 2020-10-08 RX ORDER — DIPHENHYDRAMINE HYDROCHLORIDE 50 MG/ML
INJECTION INTRAMUSCULAR; INTRAVENOUS AS NEEDED
Status: DISCONTINUED | OUTPATIENT
Start: 2020-10-08 | End: 2020-10-08 | Stop reason: HOSPADM

## 2020-10-08 RX ORDER — OXYCODONE HYDROCHLORIDE AND ACETAMINOPHEN 5; 325 MG/1; MG/1
1 TABLET ORAL ONCE
Status: COMPLETED | OUTPATIENT
Start: 2020-10-08 | End: 2020-10-08

## 2020-10-08 RX ORDER — BUTALBITAL, ACETAMINOPHEN AND CAFFEINE 50; 325; 40 MG/1; MG/1; MG/1
1 TABLET ORAL EVERY 4 HOURS PRN
Status: DISCONTINUED | OUTPATIENT
Start: 2020-10-08 | End: 2020-10-09 | Stop reason: HOSPADM

## 2020-10-08 RX ORDER — OXYCODONE HYDROCHLORIDE AND ACETAMINOPHEN 5; 325 MG/1; MG/1
1 TABLET ORAL EVERY 6 HOURS PRN
Qty: 5 TABLET | Refills: 0 | Status: SHIPPED | OUTPATIENT
Start: 2020-10-08 | End: 2020-10-19

## 2020-10-08 RX ORDER — ACETAMINOPHEN 650 MG/1
650 SUPPOSITORY RECTAL EVERY 4 HOURS PRN
Status: DISCONTINUED | OUTPATIENT
Start: 2020-10-08 | End: 2020-10-09 | Stop reason: HOSPADM

## 2020-10-08 RX ADMIN — FAMOTIDINE 20 MG: 10 INJECTION INTRAVENOUS at 20:56

## 2020-10-08 RX ADMIN — METHYLPREDNISOLONE SODIUM SUCCINATE 125 MG: 125 INJECTION, POWDER, FOR SOLUTION INTRAMUSCULAR; INTRAVENOUS at 20:26

## 2020-10-08 RX ADMIN — OXYCODONE HYDROCHLORIDE AND ACETAMINOPHEN 1 TABLET: 5; 325 TABLET ORAL at 19:44

## 2020-10-08 RX ADMIN — EPINEPHRINE 0.3 MG: 1 INJECTION, SOLUTION, CONCENTRATE INTRAVENOUS at 20:56

## 2020-10-08 RX ADMIN — LISINOPRIL 40 MG: 40 TABLET ORAL at 08:55

## 2020-10-08 RX ADMIN — SODIUM CHLORIDE, PRESERVATIVE FREE 10 ML: 5 INJECTION INTRAVENOUS at 09:00

## 2020-10-08 RX ADMIN — BUTALBITAL, ACETAMINOPHEN, AND CAFFEINE 1 TABLET: 50; 325; 40 TABLET ORAL at 10:53

## 2020-10-08 RX ADMIN — DIPHENHYDRAMINE HYDROCHLORIDE 25 MG: 50 INJECTION INTRAMUSCULAR; INTRAVENOUS at 20:25

## 2020-10-08 RX ADMIN — ACETAMINOPHEN 650 MG: 325 TABLET, FILM COATED ORAL at 06:53

## 2020-10-08 RX ADMIN — VANCOMYCIN HYDROCHLORIDE 1750 MG: 10 INJECTION, POWDER, LYOPHILIZED, FOR SOLUTION INTRAVENOUS at 13:16

## 2020-10-08 RX ADMIN — DIPHENHYDRAMINE HYDROCHLORIDE 25 MG: 50 INJECTION INTRAMUSCULAR; INTRAVENOUS at 20:07

## 2020-10-08 RX ADMIN — ACETAMINOPHEN 650 MG: 325 TABLET, FILM COATED ORAL at 22:52

## 2020-10-08 RX ADMIN — ESCITALOPRAM OXALATE 40 MG: 20 TABLET ORAL at 08:55

## 2020-10-08 NOTE — PLAN OF CARE
Goal Outcome Evaluation:         Pt got pacemaker today. Site is c/d/I. No c/o pain. VSS. Will continue to monitor.

## 2020-10-08 NOTE — PAYOR COMM NOTE
"Id # [id number]  Marian Slater RN, BSN  Phone # [phone]  Fax # [phone]  Suellen Allan (50 y.o. Female)     Date of Birth Social Security Number Address Home Phone MRN    1970  [address]  [facility] [phone] [id number]    [employer] Marital Status          Islam Single       Admission Date Admission Type Admitting Provider Attending Provider Department, Room/Bed    10/7/20 Emergency Eva Gastelum MD Hunter, Sarah M, MD [facility] 6A, N616/1    Discharge Date Discharge Disposition Discharge Destination                       Attending Provider: Eva Gastelum MD    Allergies: Bee Venom, Cat Hair Extract, Molds & Smuts, Penicillins    Isolation: None   Infection: None   Code Status: CPR    Ht: 170.2 cm (67.01\")   Wt: 88 kg (193 lb 14.4 oz)    Admission Cmt: None   Principal Problem: Syncope [R55]                 Active Insurance as of 10/7/2020     Primary Coverage     Payor Plan Insurance Group Employer/Plan Group    [employer] [place] [employer] [place]ICA[place]      Payor Plan Address Payor Plan Phone Number Payor Plan Fax Number Effective Dates    [address] [address] [phone]  2018 - None Entered    [place] [address]       Subscriber Name Subscriber Birth Date Member ID       SUELLEN ALLAN 1970 [id number]                      History & Physical      Wilber Chatterjee DO at 10/07/20 2323              [facility] Medicine Services  HISTORY AND PHYSICAL    Patient Name: Suellen Allan  : 1970  MRN: [id number]  Primary Care Physician: Jaimee Bobo MD  Date of admission: 10/7/2020      Subjective   Subjective     Chief Complaint:  syncope    HPI:  Suellen Allan is a 50 y.o. female with a history of anxiety, depression, cardiomyopathy, HLD, HTN, stroke, recurrent syncope, AV block, tachybradycardia syndrome, presents to the ED with complaints of syncope.  Patient is scheduled for a Pacemaker-[employer] DDDR on Monday with Dr." Donovan for transient AV block with associated symptomatic bradycardia.  Patient reports that this morning she had 2 syncopal episodes.  She is unsure of how long she had loss of consciousness.  After first episode she called her friend to come and take her to the hospital, upon friends arrival patient was unconscious on the floor for unknown amount of time.  Patient states that she has not felt well all day today.  She has been experiencing intermittent chest pain, fatigue, shortness of breath, and nausea.  She denies any fever, cough, vomiting, abdominal pain, edema, dysuria, or any other complaints at this time.  Troponin <0.010.  EKG shows first-degree AV block.  Patient is to be admitted to the hospital medicine service for further evaluation and management.    Review of Systems   Constitutional: Positive for fatigue. Negative for appetite change, chills, diaphoresis and fever.   HENT: Negative.    Eyes: Negative.    Respiratory: Positive for shortness of breath. Negative for cough.    Cardiovascular: Positive for chest pain. Negative for palpitations and leg swelling.   Gastrointestinal: Positive for nausea. Negative for abdominal distention, abdominal pain, diarrhea and vomiting.   Endocrine: Negative.    Genitourinary: Negative.    Musculoskeletal: Negative.    Skin: Negative.    Allergic/Immunologic: Negative.    Neurological: Negative.    Hematological: Negative.    Psychiatric/Behavioral: Negative.           Personal History     Past Medical History:   Diagnosis Date   • Anxiety and depression    • Asthma    • Cardiomyopathy (CMS/HCC)    • Hyperlipidemia    • Hypertension    • Menopause    • Stroke (CMS/HCC)        Past Surgical History:   Procedure Laterality Date   • ADENOIDECTOMY     •  SECTION     • COLONOSCOPY     • OTHER SURGICAL HISTORY      LOOP RECORDER   • RHINOPLASTY     • TONSILLECTOMY         Family History: family history includes Cardiomyopathy in her sister; Diabetes in her  sister; Heart attack in her brother and sister; Heart failure in her father; Hyperlipidemia in her brother and brother; Hypertension in her brother, brother, mother, sister, sister, sister, sister, and sister; No Known Problems in her sister and sister; Stroke in her brother and brother. Otherwise pertinent FHx was reviewed and unremarkable.     Social History:  reports that she has never smoked. She has never used smokeless tobacco. She reports that she does not drink alcohol or use drugs.  Social History     Social History Narrative   • Not on file       Medications:  Available home medication information reviewed.  (Not in a hospital admission)      Allergies   Allergen Reactions   • Bee Venom Anaphylaxis   • Cat Hair Extract Anaphylaxis   • Molds & Smuts Anaphylaxis   • Penicillins Anaphylaxis       Objective   Objective     Vital Signs:   Temp:  [96.8 °F (36 °C)] 96.8 °F (36 °C)  Heart Rate:  [61-84] 61  Resp:  [20] 20  BP: (125-149)/() 133/90        Physical Exam  Constitutional:       General: She is not in acute distress.     Appearance: She is not toxic-appearing or diaphoretic.   HENT:      Head: Normocephalic.      Nose: Nose normal.      Mouth/Throat:      Mouth: Mucous membranes are moist.   Eyes:      Pupils: Pupils are equal, round, and reactive to light.   Neck:      Musculoskeletal: Normal range of motion and neck supple.   Cardiovascular:      Rate and Rhythm: Normal rate and regular rhythm.      Pulses: Normal pulses.      Heart sounds: Normal heart sounds. No murmur. No friction rub. No gallop.    Pulmonary:      Effort: Respiratory distress (mild dyspnea with conversation) present.      Breath sounds: Normal breath sounds. No wheezing, rhonchi or rales.   Abdominal:      General: Bowel sounds are normal. There is no distension.      Palpations: Abdomen is soft.      Tenderness: There is no abdominal tenderness. There is no guarding or rebound.   Musculoskeletal: Normal range of motion.          General: No swelling, tenderness or signs of injury.   Skin:     General: Skin is warm and dry.      Coloration: Skin is not pale.      Findings: No erythema or rash.   Neurological:      General: No focal deficit present.      Mental Status: She is alert and oriented to person, place, and time.      Cranial Nerves: No cranial nerve deficit.      Sensory: No sensory deficit.   Psychiatric:         Mood and Affect: Mood normal.         Behavior: Behavior normal.         Thought Content: Thought content normal.         Judgment: Judgment normal.            Results Reviewed:  I have personally reviewed current lab and radiology data.    Results from last 7 days   Lab Units 10/07/20  2214   WBC 10*3/mm3 8.34   HEMOGLOBIN g/dL 13.5   HEMATOCRIT % 41.8   PLATELETS 10*3/mm3 278   INR  0.99     Results from last 7 days   Lab Units 10/07/20  2214   SODIUM mmol/L 140   POTASSIUM mmol/L 3.8   CHLORIDE mmol/L 102   CO2 mmol/L 29.0   BUN mg/dL 13   CREATININE mg/dL 0.84   GLUCOSE mg/dL 92   CALCIUM mg/dL 9.5   ALT (SGPT) U/L 31   AST (SGOT) U/L 24   TROPONIN T ng/mL <0.010     Estimated Creatinine Clearance: 85.2 mL/min (by C-G formula based on SCr of 0.84 mg/dL).  Brief Urine Lab Results     None        Imaging Results (Last 24 Hours)     ** No results found for the last 24 hours. **        Results for orders placed during the hospital encounter of 10/05/20   Adult Transthoracic Echo Complete W/ Cont if Necessary Per Protocol    Narrative · Left ventricular ejection fraction appears to be 61 - 65%. Left   ventricular systolic function is normal.          Assessment/Plan   Assessment & Plan     Active Hospital Problems    Diagnosis POA   • **Syncope [R55] Yes   • HLD (hyperlipidemia) [E78.5] Yes   • AV block [I44.30] Yes   • Cardiomyopathy (CMS/HCC) [I42.9] Yes   • Essential hypertension [I10] Yes     Yamilet Allan is a 50 y.o. female with a history of anxiety, depression, cardiomyopathy, HLD, HTN, stroke, recurrent  syncope, AV block, tachybradycardia syndrome, presents to the ED with complaints of syncope.  Patient is scheduled for a Pacemaker-[employer] DDDR on Monday with Dr. Man for transient AV block with associated symptomatic bradycardia.   Troponin <0.010.  EKG shows first-degree AV block.      Assessment and Plan:    Syncope  AV Block  Cardiomyopathy  Tachybradycardia syndrome   HTN  HLD  --fall precautions  --consult Dr. Man in the am  --npo after midnight  --EKG in the am  --pacer pads in place   --continue lisinopril  --am labs  **Respiratory panel PCR w/COVID-19    DVT prophylaxis:  mechanical      CODE STATUS:  full  Code Status and Medical Interventions:   Ordered at: 10/08/20 0003     Level Of Support Discussed With:    Patient     Code Status:    CPR     Medical Interventions (Level of Support Prior to Arrest):    Full       Admission Status:  I believe this patient meets INPATIENT status due to syncope, need for pacer.  I feel patient’s risk for adverse outcomes and need for care warrant INPATIENT evaluation and I predict the patient’s care encounter to likely last beyond 2 midnights.      Electronically signed by LUIS Wylie, 10/08/20, 12:04 AM EDT.       LUIS Wylie  10/08/20         Attending   Admission Attestation       I have seen and examined the patient, performing an independent face-to-face diagnostic evaluation with plan of care reviewed and developed with the advanced practice clinician (APC).      Brief Summary Statement:   Yamilet Allan is a 50 y.o. female with past medical history of hypertension, hyperlipidemia, stroke, recurrent syncope, cardiomyopathy, anxiety, depression, AV block, tachybradycardia syndrome who presents to the ER with complaints of syncope x2 earlier this evening.    Patient states that she has been following with Dr. Man of cardiology for transient AV block with symptomatic bradycardia.  Patient reports that she had 2 episodes of  syncope today with loss of consciousness.  No reported injury.  She was found unconscious on the floor for unknown amount of time by friends.  Patient reports she has been having intermittent chest pain, fatigue, shortness of air, and nausea that cardiology has been aware of.  She reports she has not felt well overall today.  Denies any cough, shortness of air, exposure to COVID-19, or any other symptoms.    Patient admitted for cardiology evaluation and consideration of pacemaker placement.  Remainder of detailed HPI is as noted by APC and has been reviewed and/or edited by me for completeness.    Attending Physical Exam:  Constitutional: Awake, alert  Eyes: PERRLA, sclerae anicteric, no conjunctival injection  HENT: NCAT, mucous membranes moist  Neck: Supple, no thyromegaly, no lymphadenopathy, trachea midline  Respiratory: Clear to auscultation bilaterally, nonlabored respirations   Cardiovascular: RRR, no murmurs, rubs, or gallops, palpable pedal pulses bilaterally  Gastrointestinal: Positive bowel sounds, soft, nontender, nondistended  Musculoskeletal: No bilateral ankle edema, no clubbing or cyanosis to extremities  Psychiatric: Appropriate affect, cooperative  Neurologic: Oriented x 3, strength symmetric in all extremities, Cranial Nerves grossly intact to confrontation, speech clear  Skin: No rashes      Brief Assessment/Plan :  See detailed assessment and plan developed with APC which I have reviewed and/or edited for completeness.        Admission Status: I believe that this patient meets INPATIENT status due to syncope, consideration of pacemaker placement.  I feel patient’s risk for adverse outcomes and need for care warrant INPATIENT evaluation and I predict the patient’s care encounter to likely last beyond 2 midnights.        Wilber Chatterjee DO  10/08/20                  Electronically signed by Wilber Chatterjee DO at 10/08/20 0208          Emergency Department Notes      Raul, Justin Baker MD at  10/08/20 0427          Subjective   50-year-old female presents for evaluation following syncopal episode x2.  Of note, the patient is a long history of recurrent syncopal episodes and tachybradycardia syndrome.  She has been wearing a cardiac monitor intermittently now for the past few years.  She is followed by Dr. Man of cardiology.  She has an upcoming appointment on Monday for a pacemaker placement given her recurrent syncope.  She also notes that she has a strong family history of hypertrophic cardiomyopathy with multiple siblings that have been diagnosed.  She states that she was told by her cardiologist that if she had any further syncopal episodes prior to Monday that she needed to come to the emergency department.  Tonight, she had 2 unprovoked syncopal episodes at home.  No preceding chest pain, shortness of breath, or palpitations.  She became concerned and came to the emergency department to be evaluated.          Review of Systems   Neurological: Positive for syncope.   All other systems reviewed and are negative.      Past Medical History:   Diagnosis Date   • Anxiety and depression    • Asthma    • Cardiomyopathy (CMS/HCC)    • Hyperlipidemia    • Hypertension    • Menopause    • Stroke (CMS/HCC)        Allergies   Allergen Reactions   • Bee Venom Anaphylaxis   • Cat Hair Extract Anaphylaxis   • Molds & Smuts Anaphylaxis   • Penicillins Anaphylaxis       Past Surgical History:   Procedure Laterality Date   • ADENOIDECTOMY     •  SECTION     • COLONOSCOPY     • OTHER SURGICAL HISTORY      LOOP RECORDER   • RHINOPLASTY     • TONSILLECTOMY         Family History   Problem Relation Age of Onset   • Hypertension Mother    • Heart failure Father    • Cardiomyopathy Sister    • Heart attack Sister    • Diabetes Sister    • Stroke Brother    • Heart attack Brother    • Hypertension Brother    • Hyperlipidemia Brother    • Stroke Brother    • Hyperlipidemia Brother    • Hypertension Brother    •  Hypertension Sister    • Hypertension Sister    • Hypertension Sister    • Hypertension Sister    • Hypertension Sister    • No Known Problems Sister    • No Known Problems Sister        Social History     Socioeconomic History   • Marital status: Single     Spouse name: Not on file   • Number of children: Not on file   • Years of education: Not on file   • Highest education level: Not on file   Tobacco Use   • Smoking status: Never Smoker   • Smokeless tobacco: Never Used   Substance and Sexual Activity   • Alcohol use: No     Frequency: Never   • Drug use: No   • Sexual activity: Defer           Objective   Physical Exam  Vitals signs and nursing note reviewed.   Constitutional:       General: She is not in acute distress.     Appearance: She is well-developed. She is not diaphoretic.      Comments: Well-appearing female in no acute distress   HENT:      Head: Normocephalic and atraumatic.   Eyes:      Pupils: Pupils are equal, round, and reactive to light.   Neck:      Musculoskeletal: Normal range of motion and neck supple.   Cardiovascular:      Rate and Rhythm: Normal rate and regular rhythm.      Heart sounds: Normal heart sounds. No murmur. No friction rub. No gallop.    Pulmonary:      Effort: Pulmonary effort is normal. No respiratory distress.      Breath sounds: Normal breath sounds. No wheezing or rales.   Abdominal:      General: Bowel sounds are normal. There is no distension.      Palpations: Abdomen is soft. There is no mass.      Tenderness: There is no abdominal tenderness. There is no guarding or rebound.   Musculoskeletal: Normal range of motion.      Right lower leg: No edema.      Left lower leg: No edema.   Skin:     General: Skin is warm and dry.      Findings: No erythema or rash.   Neurological:      Mental Status: She is alert and oriented to person, place, and time.   Psychiatric:         Thought Content: Thought content normal.         Judgment: Judgment normal.          Procedures          ED Course  ED Course as of Oct 08 0427   u Oct 08, 2020   0034 50-year-old female presents for evaluation following syncope x2 tonight.  Of note, the patient has a long history of syncopal episodes.  She is followed by Dr. Man of cardiology.  She has a strong family history of hypertrophic cardiomyopathy.  She was scheduled to have a pacemaker placed on Monday but was told that if she had any events in the interim that she should come to the emergency department.  Tonight, she was at home at rest and had 2 unprovoked syncopal episodes, prompting her visit to the emergency department.  On arrival, the patient is nontoxic-appearing and is currently asymptomatic.    [DD]   0035 EKG revealed sinus rhythm with first-degree AV block, heart rate of 76,    [DD]   0035  and inferior Q waves (somewhat concerning for HOCM given the patient's family history).  No EKG evidence of Brugada syndrome.  Normal QT intervals.    [DD]   0036 Labs are unrevealing.  I discussed the patient's case with Dr. Chatterjee, and the patient will be admitted under his care for further evaluation, observation, and treatment and for cardiology consult tomorrow morning.    [DD]      ED Course User Index  [DD] Justin Carter MD                                   Recent Results (from the past 24 hour(s))   Comprehensive Metabolic Panel    Collection Time: 10/07/20 10:14 PM    Specimen: Blood   Result Value Ref Range    Glucose 92 65 - 99 mg/dL    BUN 13 6 - 20 mg/dL    Creatinine 0.84 0.57 - 1.00 mg/dL    Sodium 140 136 - 145 mmol/L    Potassium 3.8 3.5 - 5.2 mmol/L    Chloride 102 98 - 107 mmol/L    CO2 29.0 22.0 - 29.0 mmol/L    Calcium 9.5 8.6 - 10.5 mg/dL    Total Protein 7.2 6.0 - 8.5 g/dL    Albumin 4.60 3.50 - 5.20 g/dL    ALT (SGPT) 31 1 - 33 U/L    AST (SGOT) 24 1 - 32 U/L    Alkaline Phosphatase 78 39 - 117 U/L    Total Bilirubin 0.2 0.0 - 1.2 mg/dL    eGFR Non African Amer 71 >60 mL/min/1.73    Globulin  2.6 gm/dL    A/G Ratio 1.8 g/dL    BUN/Creatinine Ratio 15.5 7.0 - 25.0    Anion Gap 9.0 5.0 - 15.0 mmol/L   Protime-INR    Collection Time: 10/07/20 10:14 PM    Specimen: Blood   Result Value Ref Range    Protime 12.8 11.5 - 14.0 Seconds    INR 0.99 0.85 - 1.16   Troponin    Collection Time: 10/07/20 10:14 PM    Specimen: Blood   Result Value Ref Range    Troponin T <0.010 0.000 - 0.030 ng/mL   T4, Free    Collection Time: 10/07/20 10:14 PM    Specimen: Blood   Result Value Ref Range    Free T4 0.82 (L) 0.93 - 1.70 ng/dL   TSH    Collection Time: 10/07/20 10:14 PM    Specimen: Blood   Result Value Ref Range    TSH 3.760 0.270 - 4.200 uIU/mL   Magnesium    Collection Time: 10/07/20 10:14 PM    Specimen: Blood   Result Value Ref Range    Magnesium 2.2 1.6 - 2.6 mg/dL   CBC Auto Differential    Collection Time: 10/07/20 10:14 PM    Specimen: Blood   Result Value Ref Range    WBC 8.34 3.40 - 10.80 10*3/mm3    RBC 4.50 3.77 - 5.28 10*6/mm3    Hemoglobin 13.5 12.0 - 15.9 g/dL    Hematocrit 41.8 34.0 - 46.6 %    MCV 92.9 79.0 - 97.0 fL    MCH 30.0 26.6 - 33.0 pg    MCHC 32.3 31.5 - 35.7 g/dL    RDW 12.9 12.3 - 15.4 %    RDW-SD 44.0 37.0 - 54.0 fl    MPV 10.0 6.0 - 12.0 fL    Platelets 278 140 - 450 10*3/mm3    Neutrophil % 49.8 42.7 - 76.0 %    Lymphocyte % 36.6 19.6 - 45.3 %    Monocyte % 8.8 5.0 - 12.0 %    Eosinophil % 3.6 0.3 - 6.2 %    Basophil % 0.7 0.0 - 1.5 %    Immature Grans % 0.5 0.0 - 0.5 %    Neutrophils, Absolute 4.16 1.70 - 7.00 10*3/mm3    Lymphocytes, Absolute 3.05 0.70 - 3.10 10*3/mm3    Monocytes, Absolute 0.73 0.10 - 0.90 10*3/mm3    Eosinophils, Absolute 0.30 0.00 - 0.40 10*3/mm3    Basophils, Absolute 0.06 0.00 - 0.20 10*3/mm3    Immature Grans, Absolute 0.04 0.00 - 0.05 10*3/mm3    nRBC 0.0 0.0 - 0.2 /100 WBC   Respiratory Panel PCR w/COVID-19(SARS-CoV-2) [facility] In-House, NP Swab in [facility], 3-4 HR TAT - Swab, Nasopharynx    Collection Time: 10/07/20 11:23 PM    Specimen:  Nasopharynx; Swab   Result Value Ref Range    ADENOVIRUS, PCR Not Detected Not Detected    Coronavirus 229E Not Detected Not Detected    Coronavirus HKU1 Not Detected Not Detected    Coronavirus NL63 Not Detected Not Detected    Coronavirus OC43 Not Detected Not Detected    COVID19 Not Detected Not Detected - Ref. Range    Human Metapneumovirus Not Detected Not Detected    Human Rhinovirus/Enterovirus Not Detected Not Detected    Influenza A PCR Not Detected Not Detected    Influenza A H1 Not Detected Not Detected    Influenza A H1 2009 PCR Not Detected Not Detected    Influenza A H3 Not Detected Not Detected    Influenza B PCR Not Detected Not Detected    Parainfluenza Virus 1 Not Detected Not Detected    Parainfluenza Virus 2 Not Detected Not Detected    Parainfluenza Virus 3 Not Detected Not Detected    Parainfluenza Virus 4 Not Detected Not Detected    RSV, PCR Not Detected Not Detected    Bordetella pertussis pcr Not Detected Not Detected    Bordetella parapertussis PCR Not Detected Not Detected    Chlamydophila pneumoniae PCR Not Detected Not Detected    Mycoplasma pneumo by PCR Not Detected Not Detected   Urinalysis With Microscopic If Indicated (No Culture) - Urine, Clean Catch    Collection Time: 10/07/20 11:26 PM    Specimen: Urine, Clean Catch   Result Value Ref Range    Color, UA Yellow Yellow, Straw    Appearance, UA Clear Clear    pH, UA 5.5 5.0 - 8.0    Specific Gravity, UA 1.012 1.001 - 1.030    Glucose, UA Negative Negative    Ketones, UA Negative Negative    Bilirubin, UA Negative Negative    Blood, UA Negative Negative    Protein, UA Negative Negative    Leuk Esterase, UA Negative Negative    Nitrite, UA Negative Negative    Urobilinogen, UA 0.2 E.U./dL 0.2 - 1.0 E.U./dL     Note: In addition to lab results from this visit, the labs listed above may include labs taken at another facility or during a different encounter within the last 24 hours. Please correlate lab times with ED admission and  "discharge times for further clarification of the services performed during this visit.    No orders to display     Vitals:    10/08/20 0030 10/08/20 0045 10/08/20 0122 10/08/20 0338   BP: 106/65 104/68 114/76 110/72   BP Location:   Right arm Left arm   Patient Position:    Lying   Pulse: 80 58 71 58   Resp:   20 17   Temp:   97.5 °F (36.4 °C) 98 °F (36.7 °C)   TempSrc:   Oral Oral   SpO2: 99% 95% 96%    Weight:   88 kg (193 lb 14.4 oz)    Height:   170.2 cm (67.01\")      Medications   sodium chloride 0.9 % flush 10 mL (has no administration in time range)   lisinopril (PRINIVIL,ZESTRIL) tablet 40 mg (has no administration in time range)   escitalopram (LEXAPRO) tablet 40 mg (has no administration in time range)   sodium chloride 0.9 % flush 10 mL ( Intravenous Canceled Entry 10/8/20 0304)   sodium chloride 0.9 % flush 10 mL (has no administration in time range)   acetaminophen (TYLENOL) tablet 650 mg (has no administration in time range)     Or   acetaminophen (TYLENOL) 160 MG/5ML solution 650 mg (has no administration in time range)     Or   acetaminophen (TYLENOL) suppository 650 mg (has no administration in time range)   ondansetron (ZOFRAN) tablet 4 mg (has no administration in time range)     Or   ondansetron (ZOFRAN) injection 4 mg (has no administration in time range)   sodium chloride 0.9 % bolus 500 mL (0 mL Intravenous Stopped 10/7/20 2322)   metoclopramide (REGLAN) injection 5 mg (5 mg Intravenous Given 10/7/20 2323)     ECG/EMG Results (last 24 hours)     Procedure Component Value Units Date/Time    ECG 12 Lead [[id number]] Collected: 10/07/20 2154     Updated: 10/07/20 2153        ECG 12 Lead         ECG 12 Lead    (Results Pending)               MDM    Final diagnoses:   Syncope, unspecified syncope type            Justin Carter MD  10/08/20 0429      Electronically signed by Justin Carter MD at 10/08/20 0429       Operative/Procedure Notes (last 72 hours) (Notes from 10/05/20 1101 " through 10/08/20 1101)    No notes of this type exist for this encounter.            Physician Progress Notes (last 72 hours) (Notes from 10/05/20 110 through 10/08/20 1101)      Eva Gastelum MD at 10/08/20 0818              [facility] Medicine Services  PROGRESS NOTE    Patient Name: Yamilet Allan  : 1970  MRN: [id number]    Date of Admission: 10/7/2020  Primary Care Physician: Jaimee Bobo MD    Subjective   Subjective     CC:  syncope    HPI:  Patient doing ok. No further episodes of syncope overnight.   No other complaints at this time aside from a HA- she feels that she is dehydrated. NPO for planned PPM insertion today.    Review of Systems  Gen- No fevers, chills, +HA  CV- No chest pain, palpitations  Resp- No cough, dyspnea  GI- No N/V/D, abd pain         Objective   Objective     Vital Signs:   Temp:  [96.8 °F (36 °C)-98 °F (36.7 °C)] 97.7 °F (36.5 °C)  Heart Rate:  [58-84] 64  Resp:  [17-20] 18  BP: (104-149)/() 115/77        Physical Exam:  GEN- no acute distress noted, resting in bed, awake  HEENT- atraumatic, normocephalic, eomi  NECK- supple, trachea midline, no masses  RESP: ctab, normal effort  CV: no murmurs, s1/s2, rrr  MSK: no edema noted, spontaneous movement of all extremities  NEURO: alert, oriented, no focal deficits  SKIN: no rashes  PSYCH: appropriate mood and affect       Results Reviewed:  Results from last 7 days   Lab Units 10/08/20  0308 10/07/20  2214   WBC 10*3/mm3 6.90 8.34   HEMOGLOBIN g/dL 12.7 13.5   HEMATOCRIT % 40.3 41.8   PLATELETS 10*3/mm3 246 278   INR   --  0.99     Results from last 7 days   Lab Units 10/08/20  0308 10/07/20  2214   SODIUM mmol/L 138 140   POTASSIUM mmol/L 4.2 3.8   CHLORIDE mmol/L 103 102   CO2 mmol/L 23.0 29.0   BUN mg/dL 13 13   CREATININE mg/dL 0.84 0.84   GLUCOSE mg/dL 89 92   CALCIUM mg/dL 8.8 9.5   ALT (SGPT) U/L  --  31   AST (SGOT) U/L  --  24   TROPONIN T ng/mL <0.010 <0.010     Estimated Creatinine  Clearance: 89.3 mL/min (by C-G formula based on SCr of 0.84 mg/dL).    Microbiology Results Abnormal     Procedure Component Value - Date/Time    COVID PRE-OP / PRE-PROCEDURE SCREENING ORDER (NO ISOLATION) - Swab, Nasopharynx [[id number]]  (Normal) Collected: 10/07/20 2323    Lab Status: Final result Specimen: Swab from Nasopharynx Updated: 10/08/20 0153    Narrative:      The following orders were created for panel order COVID PRE-OP / PRE-PROCEDURE SCREENING ORDER (NO ISOLATION) - Swab, Nasopharynx.  Procedure                               Abnormality         Status                     ---------                               -----------         ------                     Respiratory Panel PCR w/...[[id number]]  Normal              Final result                 Please view results for these tests on the individual orders.    Respiratory Panel PCR w/COVID-19(SARS-CoV-2) [facility] In-House, NP Swab in UTM/VTM, 3-4 HR TAT - Swab, Nasopharynx [[id number]]  (Normal) Collected: 10/07/20 2323    Lab Status: Final result Specimen: Swab from Nasopharynx Updated: 10/08/20 0153     ADENOVIRUS, PCR Not Detected     Coronavirus 229E Not Detected     Coronavirus HKU1 Not Detected     Coronavirus NL63 Not Detected     Coronavirus OC43 Not Detected     COVID19 Not Detected     Human Metapneumovirus Not Detected     Human Rhinovirus/Enterovirus Not Detected     Influenza A PCR Not Detected     Influenza A H1 Not Detected     Influenza A H1 2009 PCR Not Detected     Influenza A H3 Not Detected     Influenza B PCR Not Detected     Parainfluenza Virus 1 Not Detected     Parainfluenza Virus 2 Not Detected     Parainfluenza Virus 3 Not Detected     Parainfluenza Virus 4 Not Detected     RSV, PCR Not Detected     Bordetella pertussis pcr Not Detected     Bordetella parapertussis PCR Not Detected     Chlamydophila pneumoniae PCR Not Detected     Mycoplasma pneumo by PCR Not Detected    Narrative:      Fact sheet for providers:  https://docs.[employer]/wp-content/uploads/WAH6997-2359-DC0.1-EUA-Provider-Fact-Sheet-3.pdf    Fact sheet for patients: https://docs.[employer]/wp-content/uploads/ULU2193-1554-SE4.1-EUA-Patient-Fact-Sheet-1.pdf          Imaging Results (Last 24 Hours)     ** No results found for the last 24 hours. **          Results for orders placed during the hospital encounter of 10/05/20   Adult Transthoracic Echo Complete W/ Cont if Necessary Per Protocol    Narrative · Left ventricular ejection fraction appears to be 61 - 65%. Left   ventricular systolic function is normal.          I have reviewed the medications:  Scheduled Meds:escitalopram, 40 mg, Oral, Daily  lisinopril, 40 mg, Oral, Daily  sodium chloride, 10 mL, Intravenous, Q12H      Continuous Infusions:   PRN Meds:.•  acetaminophen **OR** acetaminophen **OR** acetaminophen  •  ondansetron **OR** ondansetron  •  [COMPLETED] Insert peripheral IV **AND** sodium chloride  •  sodium chloride    Assessment/Plan   Assessment & Plan     Active Hospital Problems    Diagnosis  POA   • **Syncope [R55]  Yes   • HLD (hyperlipidemia) [E78.5]  Yes   • AV block [I44.30]  Yes   • Cardiomyopathy (CMS/HCC) [I42.9]  Yes   • Essential hypertension [I10]  Yes      Resolved Hospital Problems   No resolved problems to display.        Brief Hospital Course to date:  Yamilet Allan is a 50 y.o. female with history of anxiety, depression, tachy-rika  Syndrome, AV block who was scheduled for PPM with Dr Man Monday 10/12, who presents from home after 2 syncopal episodes. Patient reports multiple symptoms including intermittent chest pain, fatigue, sob, nausea. EKG noted first degree AV block.     Syncope, recurrent in setting of known tachy-rika syndrome with AV block  History of symptomatic bradycardia  Cardiomyopathy   ---continue to monitor closely on tele, likely that her syncope is related to her known arrythmia  ---consult placed to cardiology- as per HPI- patient due to  have PPM placed 10/12 with Dr Man  ---NPO for now, covid pre-op negative  ---pacer pads in place     HTN  ---continue home lisinopril, monitor    Depression  ---continue home lexapro       DVT Prophylaxis:  [facility]      Disposition: I expect the patient to be discharged pending PPM placement- possible tomorrow.    CODE STATUS:   Code Status and Medical Interventions:   Ordered at: 10/08/20 0003     Level Of Support Discussed With:    Patient     Code Status:    CPR     Medical Interventions (Level of Support Prior to Arrest):    Full       Eva Gastelum MD  10/08/20                Electronically signed by Eva Gastelum MD at 10/08/20 1036          Consult Notes (last 72 hours) (Notes from 10/05/20 1101 through 10/08/20 1101)      Lewis Man MD at 10/08/20 0940      Consult Orders    1. Inpatient Cardiology Consult [[id number]] ordered by Giulia Alexander APRN at 10/08/20 0002               Yamilet Allan  1970  PCP: Jaimee Bobo MD    SUBJECTIVE:   Yamilet Allan is a 50 y.o. female seen for a consultation visit regarding the following:     Chief Complaint:   Chief Complaint   Patient presents with   • Chest Pain          Consultation is requested by Jaimee Bobo MD for evaluation of Chest Pain        History:  This is a 50-year-old patient referred by Dr. Veto Atwood for further evaluation management of syncope with associated complete AV block.  The patient reports that she has had issues since October 2015 when she had a cerebral vascular accident.  She is had great fluctuation in her heart rate over the years.  She reports that she now is experiencing tachybradycardia syndrome on a daily basis.  She also had episodes of near syncope where she will have sudden near transient loss of consciousness.  She has implantable loop recorder that is documented transient complete AV block.  She presented to the emergency room last night after having recurrent syncopal events.      Cardiac PMH: (Old  records have been reviewed and summarized below)  1.  CVA-2015  2.  Tachybradycardia syndrome  3.  Near syncope  4.  [employer] loop implant  5.  Documented transient complete AV block  6.  Echo-2020-normal ejection fraction    Past Medical History, Past Surgical History, Family history, Social History, and Medications were all reviewed with the patient today and updated as necessary.       Current Facility-Administered Medications:   •  acetaminophen (TYLENOL) tablet 650 mg, 650 mg, Oral, Q4H PRN, 650 mg at 10/08/20 0653 **OR** acetaminophen (TYLENOL) 160 MG/5ML solution 650 mg, 650 mg, Oral, Q4H PRN **OR** acetaminophen (TYLENOL) suppository 650 mg, 650 mg, Rectal, Q4H PRN, Marc Alexandera W, APRN  •  escitalopram (LEXAPRO) tablet 40 mg, 40 mg, Oral, Daily, Marc Alexandera W, APRN, 40 mg at 10/08/20 0855  •  lisinopril (PRINIVIL,ZESTRIL) tablet 40 mg, 40 mg, Oral, Daily, Catherine, Giulia W, APRN, 40 mg at 10/08/20 0855  •  ondansetron (ZOFRAN) tablet 4 mg, 4 mg, Oral, Q6H PRN **OR** ondansetron (ZOFRAN) injection 4 mg, 4 mg, Intravenous, Q6H PRN, Marc Alexandera W, APRN  •  [COMPLETED] Insert peripheral IV, , , Once **AND** sodium chloride 0.9 % flush 10 mL, 10 mL, Intravenous, PRN, Marc Alexanedra W, APRN  •  sodium chloride 0.9 % flush 10 mL, 10 mL, Intravenous, Q12H, Catherine, Giulia W, APRN, 10 mL at 10/08/20 0900  •  sodium chloride 0.9 % flush 10 mL, 10 mL, Intravenous, PRN, Catherine Giulia W, APRN    Allergies   Allergen Reactions   • Bee Venom Anaphylaxis   • Cat Hair Extract Anaphylaxis   • Molds & Smuts Anaphylaxis   • Penicillins Anaphylaxis         Past Medical History:   Diagnosis Date   • Anxiety and depression    • Asthma    • Cardiomyopathy (CMS/HCC)    • Hyperlipidemia    • Hypertension    • Menopause    • Stroke (CMS/HCC)      Past Surgical History:   Procedure Laterality Date   • ADENOIDECTOMY     •  SECTION     • COLONOSCOPY     • OTHER SURGICAL HISTORY  "     LOOP RECORDER   • RHINOPLASTY     • TONSILLECTOMY       Family History   Problem Relation Age of Onset   • Hypertension Mother    • Heart failure Father    • Cardiomyopathy Sister    • Heart attack Sister    • Diabetes Sister    • Stroke Brother    • Heart attack Brother    • Hypertension Brother    • Hyperlipidemia Brother    • Stroke Brother    • Hyperlipidemia Brother    • Hypertension Brother    • Hypertension Sister    • Hypertension Sister    • Hypertension Sister    • Hypertension Sister    • Hypertension Sister    • No Known Problems Sister    • No Known Problems Sister      Social History     Tobacco Use   • Smoking status: Never Smoker   • Smokeless tobacco: Never Used   Substance Use Topics   • Alcohol use: No     Frequency: Never       ROS:  Review of Systems:  General: no recent weight loss/gain, weakness or fatigue  Skin: no rashes, lumps, or other skin changes  HEENT: no dizziness, lightheadedness, or vision changes  Respiratory: no cough or hemoptysis  Cardiovascular: + palpitations, and tachycardia  Gastrointestinal: no black/tarry stools or diarrhea  Urinary: no change in frequency or urgency  Peripheral Vascular: no claudication or leg cramps  Musculoskeletal: no muscle or joint pain/stiffness  Psychiatric: no depression or excessive stress  Neurological: + syncope  Hematologic: no anemia, easy bruising or bleeding  Endocrine: no thyroid problems, nor heat or cold intolerance    PHYSICAL EXAM:   /70   Pulse 69   Temp 97.7 °F (36.5 °C) (Oral)   Resp 18   Ht 170.2 cm (67.01\")   Wt 88 kg (193 lb 14.4 oz)   SpO2 97%   BMI 30.36 kg/m²      Wt Readings from Last 5 Encounters:   10/08/20 88 kg (193 lb 14.4 oz)   10/06/20 84.9 kg (187 lb 3.2 oz)   09/08/20 82.1 kg (181 lb)   01/14/20 87.5 kg (193 lb)   10/22/19 73.5 kg (162 lb)     BP Readings from Last 5 Encounters:   10/08/20 104/70   10/06/20 110/82   09/08/20 114/76   01/14/20 122/86   10/22/19 122/84       General-Well Nourished, " Well developed  Eyes - PERRLA  Neck- supple, No mass  CV- regular rate and rhythm, no MRG  Lung- clear bilaterally  Abd- soft, +BS  Musc/skel - Norm strength and range of motion  Skin- warm and dry  Neuro - Alert & Oriented x 3, appropriate mood.    Medical problems and test results were reviewed with the patient today.     Results for orders placed or performed during the hospital encounter of 10/07/20   Respiratory Panel PCR w/COVID-19(SARS-CoV-2) [facility] In-House, NP Swab in UTM/VTM, 3-4 HR TAT - Swab, Nasopharynx    Specimen: Nasopharynx; Swab   Result Value Ref Range    ADENOVIRUS, PCR Not Detected Not Detected    Coronavirus 229E Not Detected Not Detected    Coronavirus HKU1 Not Detected Not Detected    Coronavirus NL63 Not Detected Not Detected    Coronavirus OC43 Not Detected Not Detected    COVID19 Not Detected Not Detected - Ref. Range    Human Metapneumovirus Not Detected Not Detected    Human Rhinovirus/Enterovirus Not Detected Not Detected    Influenza A PCR Not Detected Not Detected    Influenza A H1 Not Detected Not Detected    Influenza A H1 2009 PCR Not Detected Not Detected    Influenza A H3 Not Detected Not Detected    Influenza B PCR Not Detected Not Detected    Parainfluenza Virus 1 Not Detected Not Detected    Parainfluenza Virus 2 Not Detected Not Detected    Parainfluenza Virus 3 Not Detected Not Detected    Parainfluenza Virus 4 Not Detected Not Detected    RSV, PCR Not Detected Not Detected    Bordetella pertussis pcr Not Detected Not Detected    Bordetella parapertussis PCR Not Detected Not Detected    Chlamydophila pneumoniae PCR Not Detected Not Detected    Mycoplasma pneumo by PCR Not Detected Not Detected   Comprehensive Metabolic Panel    Specimen: Blood   Result Value Ref Range    Glucose 92 65 - 99 mg/dL    BUN 13 6 - 20 mg/dL    Creatinine 0.84 0.57 - 1.00 mg/dL    Sodium 140 136 - 145 mmol/L    Potassium 3.8 3.5 - 5.2 mmol/L    Chloride 102 98 - 107 mmol/L    CO2  29.0 22.0 - 29.0 mmol/L    Calcium 9.5 8.6 - 10.5 mg/dL    Total Protein 7.2 6.0 - 8.5 g/dL    Albumin 4.60 3.50 - 5.20 g/dL    ALT (SGPT) 31 1 - 33 U/L    AST (SGOT) 24 1 - 32 U/L    Alkaline Phosphatase 78 39 - 117 U/L    Total Bilirubin 0.2 0.0 - 1.2 mg/dL    eGFR Non African Amer 71 >60 mL/min/1.73    Globulin 2.6 gm/dL    A/G Ratio 1.8 g/dL    BUN/Creatinine Ratio 15.5 7.0 - 25.0    Anion Gap 9.0 5.0 - 15.0 mmol/L   Protime-INR    Specimen: Blood   Result Value Ref Range    Protime 12.8 11.5 - 14.0 Seconds    INR 0.99 0.85 - 1.16   Urinalysis With Microscopic If Indicated (No Culture) - Urine, Clean Catch    Specimen: Urine, Clean Catch   Result Value Ref Range    Color, UA Yellow Yellow, Straw    Appearance, UA Clear Clear    pH, UA 5.5 5.0 - 8.0    Specific Gravity, UA 1.012 1.001 - 1.030    Glucose, UA Negative Negative    Ketones, UA Negative Negative    Bilirubin, UA Negative Negative    Blood, UA Negative Negative    Protein, UA Negative Negative    Leuk Esterase, UA Negative Negative    Nitrite, UA Negative Negative    Urobilinogen, UA 0.2 E.U./dL 0.2 - 1.0 E.U./dL   Troponin    Specimen: Blood   Result Value Ref Range    Troponin T <0.010 0.000 - 0.030 ng/mL   T4, Free    Specimen: Blood   Result Value Ref Range    Free T4 0.82 (L) 0.93 - 1.70 ng/dL   TSH    Specimen: Blood   Result Value Ref Range    TSH 3.760 0.270 - 4.200 uIU/mL   Magnesium    Specimen: Blood   Result Value Ref Range    Magnesium 2.2 1.6 - 2.6 mg/dL   CBC Auto Differential    Specimen: Blood   Result Value Ref Range    WBC 8.34 3.40 - 10.80 10*3/mm3    RBC 4.50 3.77 - 5.28 10*6/mm3    Hemoglobin 13.5 12.0 - 15.9 g/dL    Hematocrit 41.8 34.0 - 46.6 %    MCV 92.9 79.0 - 97.0 fL    MCH 30.0 26.6 - 33.0 pg    MCHC 32.3 31.5 - 35.7 g/dL    RDW 12.9 12.3 - 15.4 %    RDW-SD 44.0 37.0 - 54.0 fl    MPV 10.0 6.0 - 12.0 fL    Platelets 278 140 - 450 10*3/mm3    Neutrophil % 49.8 42.7 - 76.0 %    Lymphocyte % 36.6 19.6 - 45.3 %    Monocyte % 8.8  5.0 - 12.0 %    Eosinophil % 3.6 0.3 - 6.2 %    Basophil % 0.7 0.0 - 1.5 %    Immature Grans % 0.5 0.0 - 0.5 %    Neutrophils, Absolute 4.16 1.70 - 7.00 10*3/mm3    Lymphocytes, Absolute 3.05 0.70 - 3.10 10*3/mm3    Monocytes, Absolute 0.73 0.10 - 0.90 10*3/mm3    Eosinophils, Absolute 0.30 0.00 - 0.40 10*3/mm3    Basophils, Absolute 0.06 0.00 - 0.20 10*3/mm3    Immature Grans, Absolute 0.04 0.00 - 0.05 10*3/mm3    nRBC 0.0 0.0 - 0.2 /100 WBC   Troponin    Specimen: Blood   Result Value Ref Range    Troponin T <0.010 0.000 - 0.030 ng/mL   CBC Auto Differential    Specimen: Blood   Result Value Ref Range    WBC 6.90 3.40 - 10.80 10*3/mm3    RBC 4.24 3.77 - 5.28 10*6/mm3    Hemoglobin 12.7 12.0 - 15.9 g/dL    Hematocrit 40.3 34.0 - 46.6 %    MCV 95.0 79.0 - 97.0 fL    MCH 30.0 26.6 - 33.0 pg    MCHC 31.5 31.5 - 35.7 g/dL    RDW 13.0 12.3 - 15.4 %    RDW-SD 44.9 37.0 - 54.0 fl    MPV 10.1 6.0 - 12.0 fL    Platelets 246 140 - 450 10*3/mm3    Neutrophil % 46.1 42.7 - 76.0 %    Lymphocyte % 37.0 19.6 - 45.3 %    Monocyte % 11.2 5.0 - 12.0 %    Eosinophil % 4.6 0.3 - 6.2 %    Basophil % 0.7 0.0 - 1.5 %    Immature Grans % 0.4 0.0 - 0.5 %    Neutrophils, Absolute 3.18 1.70 - 7.00 10*3/mm3    Lymphocytes, Absolute 2.55 0.70 - 3.10 10*3/mm3    Monocytes, Absolute 0.77 0.10 - 0.90 10*3/mm3    Eosinophils, Absolute 0.32 0.00 - 0.40 10*3/mm3    Basophils, Absolute 0.05 0.00 - 0.20 10*3/mm3    Immature Grans, Absolute 0.03 0.00 - 0.05 10*3/mm3    nRBC 0.0 0.0 - 0.2 /100 WBC   Basic Metabolic Panel    Specimen: Blood   Result Value Ref Range    Glucose 89 65 - 99 mg/dL    BUN 13 6 - 20 mg/dL    Creatinine 0.84 0.57 - 1.00 mg/dL    Sodium 138 136 - 145 mmol/L    Potassium 4.2 3.5 - 5.2 mmol/L    Chloride 103 98 - 107 mmol/L    CO2 23.0 22.0 - 29.0 mmol/L    Calcium 8.8 8.6 - 10.5 mg/dL    eGFR Non African Amer 71 >60 mL/min/1.73    BUN/Creatinine Ratio 15.5 7.0 - 25.0    Anion Gap 12.0 5.0 - 15.0 mmol/L         No results found  for: CHOL, HDL, HDLC, LDL, LDLC, VLDL    EKG:  (EKG/Tracing has been independently visualized by me and summarized below)          ASSESSMENT and PLAN  1.  Transient AV block-has associated symptomatic bradycardia.  We will plan for dual-chamber pacemaker implantation today. We discussed the procedure in great detail including risks, benefits, and alternatives. The patient understands that risks include bleeding, infection, stroke, MI, cardiac perforation, and even death.  No clear etiology at this time of the transient AV block.  May consider cardiac MRI in the future for further evaluation.  2.  Tachybradycardia syndrome-consider the addition of a low-dose beta-blocker post device implantation  3.  Syncope-appears to be associated with transient complete AV block-plan for a dual-chamber pacemaker.    4.  Pre-op with Vancomycin.         Lewis Man M.D., F.A.C.C, F.H.R.S.  Cardiology/Electrophysiology  10/08/20  09:40 EDT    Electronically signed by Lewis Man MD at 10/08/20 [id number]

## 2020-10-08 NOTE — PROGRESS NOTES
[facility] Medicine Services  PROGRESS NOTE    Patient Name: Yamilet Allan  : 1970  MRN: [id number]    Date of Admission: 10/7/2020  Primary Care Physician: Jaimee Bobo MD    Subjective   Subjective     CC:  syncope    HPI:  Patient doing ok. No further episodes of syncope overnight.   No other complaints at this time aside from a HA- she feels that she is dehydrated. NPO for planned PPM insertion today.    Review of Systems  Gen- No fevers, chills, +HA  CV- No chest pain, palpitations  Resp- No cough, dyspnea  GI- No N/V/D, abd pain         Objective   Objective     Vital Signs:   Temp:  [96.8 °F (36 °C)-98 °F (36.7 °C)] 97.7 °F (36.5 °C)  Heart Rate:  [58-84] 64  Resp:  [17-20] 18  BP: (104-149)/() 115/77        Physical Exam:  GEN- no acute distress noted, resting in bed, awake  HEENT- atraumatic, normocephalic, eomi  NECK- supple, trachea midline, no masses  RESP: ctab, normal effort  CV: no murmurs, s1/s2, rrr  MSK: no edema noted, spontaneous movement of all extremities  NEURO: alert, oriented, no focal deficits  SKIN: no rashes  PSYCH: appropriate mood and affect       Results Reviewed:  Results from last 7 days   Lab Units 10/08/20  0308 10/07/20  2214   WBC 10*3/mm3 6.90 8.34   HEMOGLOBIN g/dL 12.7 13.5   HEMATOCRIT % 40.3 41.8   PLATELETS 10*3/mm3 246 278   INR   --  0.99     Results from last 7 days   Lab Units 10/08/20  0308 10/07/20  2214   SODIUM mmol/L 138 140   POTASSIUM mmol/L 4.2 3.8   CHLORIDE mmol/L 103 102   CO2 mmol/L 23.0 29.0   BUN mg/dL 13 13   CREATININE mg/dL 0.84 0.84   GLUCOSE mg/dL 89 92   CALCIUM mg/dL 8.8 9.5   ALT (SGPT) U/L  --  31   AST (SGOT) U/L  --  24   TROPONIN T ng/mL <0.010 <0.010     Estimated Creatinine Clearance: 89.3 mL/min (by C-G formula based on SCr of 0.84 mg/dL).    Microbiology Results Abnormal     Procedure Component Value - Date/Time    COVID PRE-OP / PRE-PROCEDURE SCREENING ORDER (NO ISOLATION) - Swab, Nasopharynx  [[id number]]  (Normal) Collected: 10/07/20 2323    Lab Status: Final result Specimen: Swab from Nasopharynx Updated: 10/08/20 0153    Narrative:      The following orders were created for panel order COVID PRE-OP / PRE-PROCEDURE SCREENING ORDER (NO ISOLATION) - Swab, Nasopharynx.  Procedure                               Abnormality         Status                     ---------                               -----------         ------                     Respiratory Panel PCR w/...[[id number]]  Normal              Final result                 Please view results for these tests on the individual orders.    Respiratory Panel PCR w/COVID-19(SARS-CoV-2) [facility] In-House, NP Swab in UTM/VTM, 3-4 HR TAT - Swab, Nasopharynx [[id number]]  (Normal) Collected: 10/07/20 2323    Lab Status: Final result Specimen: Swab from Nasopharynx Updated: 10/08/20 0153     ADENOVIRUS, PCR Not Detected     Coronavirus 229E Not Detected     Coronavirus HKU1 Not Detected     Coronavirus NL63 Not Detected     Coronavirus OC43 Not Detected     COVID19 Not Detected     Human Metapneumovirus Not Detected     Human Rhinovirus/Enterovirus Not Detected     Influenza A PCR Not Detected     Influenza A H1 Not Detected     Influenza A H1 2009 PCR Not Detected     Influenza A H3 Not Detected     Influenza B PCR Not Detected     Parainfluenza Virus 1 Not Detected     Parainfluenza Virus 2 Not Detected     Parainfluenza Virus 3 Not Detected     Parainfluenza Virus 4 Not Detected     RSV, PCR Not Detected     Bordetella pertussis pcr Not Detected     Bordetella parapertussis PCR Not Detected     Chlamydophila pneumoniae PCR Not Detected     Mycoplasma pneumo by PCR Not Detected    Narrative:      Fact sheet for providers: https://docs.[employer]/wp-content/uploads/UFD3292-2552-VF9.1-EUA-Provider-Fact-Sheet-3.pdf    Fact sheet for patients: https://docs.[employer]/wp-content/uploads/HGL2643-6360-UG8.1-EUA-Patient-Fact-Sheet-1.pdf           Imaging Results (Last 24 Hours)     ** No results found for the last 24 hours. **          Results for orders placed during the hospital encounter of 10/05/20   Adult Transthoracic Echo Complete W/ Cont if Necessary Per Protocol    Narrative · Left ventricular ejection fraction appears to be 61 - 65%. Left   ventricular systolic function is normal.          I have reviewed the medications:  Scheduled Meds:escitalopram, 40 mg, Oral, Daily  lisinopril, 40 mg, Oral, Daily  sodium chloride, 10 mL, Intravenous, Q12H      Continuous Infusions:   PRN Meds:.•  acetaminophen **OR** acetaminophen **OR** acetaminophen  •  ondansetron **OR** ondansetron  •  [COMPLETED] Insert peripheral IV **AND** sodium chloride  •  sodium chloride    Assessment/Plan   Assessment & Plan     Active Hospital Problems    Diagnosis  POA   • **Syncope [R55]  Yes   • HLD (hyperlipidemia) [E78.5]  Yes   • AV block [I44.30]  Yes   • Cardiomyopathy (CMS/HCC) [I42.9]  Yes   • Essential hypertension [I10]  Yes      Resolved Hospital Problems   No resolved problems to display.        Brief Hospital Course to date:  Yamilet Allan is a 50 y.o. female with history of anxiety, depression, tachy-rika  Syndrome, AV block who was scheduled for PPM with Dr Man Monday 10/12, who presents from home after 2 syncopal episodes. Patient reports multiple symptoms including intermittent chest pain, fatigue, sob, nausea. EKG noted first degree AV block.     Syncope, recurrent in setting of known tachy-rika syndrome with AV block  History of symptomatic bradycardia  Cardiomyopathy   ---continue to monitor closely on tele, likely that her syncope is related to her known arrythmia  ---consult placed to cardiology- as per HPI- patient due to have PPM placed 10/12 with Dr Man  ---NPO for now, covid pre-op negative  ---pacer pads in place     HTN  ---continue home lisinopril, monitor    Depression  ---continue home lexapro       DVT Prophylaxis:   [facility]      Disposition: I expect the patient to be discharged pending PPM placement- possible tomorrow.    CODE STATUS:   Code Status and Medical Interventions:   Ordered at: 10/08/20 0003     Level Of Support Discussed With:    Patient     Code Status:    CPR     Medical Interventions (Level of Support Prior to Arrest):    Full       Eva Gastelum MD  10/08/20

## 2020-10-08 NOTE — ED PROVIDER NOTES
Subjective   50-year-old female presents for evaluation following syncopal episode x2.  Of note, the patient is a long history of recurrent syncopal episodes and tachybradycardia syndrome.  She has been wearing a cardiac monitor intermittently now for the past few years.  She is followed by Dr. Man of cardiology.  She has an upcoming appointment on Monday for a pacemaker placement given her recurrent syncope.  She also notes that she has a strong family history of hypertrophic cardiomyopathy with multiple siblings that have been diagnosed.  She states that she was told by her cardiologist that if she had any further syncopal episodes prior to Monday that she needed to come to the emergency department.  Tonight, she had 2 unprovoked syncopal episodes at home.  No preceding chest pain, shortness of breath, or palpitations.  She became concerned and came to the emergency department to be evaluated.          Review of Systems   Neurological: Positive for syncope.   All other systems reviewed and are negative.      Past Medical History:   Diagnosis Date   • Anxiety and depression    • Asthma    • Cardiomyopathy (CMS/HCC)    • Hyperlipidemia    • Hypertension    • Menopause    • Stroke (CMS/HCC)        Allergies   Allergen Reactions   • Bee Venom Anaphylaxis   • Cat Hair Extract Anaphylaxis   • Molds & Smuts Anaphylaxis   • Penicillins Anaphylaxis       Past Surgical History:   Procedure Laterality Date   • ADENOIDECTOMY     •  SECTION     • COLONOSCOPY     • OTHER SURGICAL HISTORY      LOOP RECORDER   • RHINOPLASTY     • TONSILLECTOMY         Family History   Problem Relation Age of Onset   • Hypertension Mother    • Heart failure Father    • Cardiomyopathy Sister    • Heart attack Sister    • Diabetes Sister    • Stroke Brother    • Heart attack Brother    • Hypertension Brother    • Hyperlipidemia Brother    • Stroke Brother    • Hyperlipidemia Brother    • Hypertension Brother    • Hypertension Sister     • Hypertension Sister    • Hypertension Sister    • Hypertension Sister    • Hypertension Sister    • No Known Problems Sister    • No Known Problems Sister        Social History     Socioeconomic History   • Marital status: Single     Spouse name: Not on file   • Number of children: Not on file   • Years of education: Not on file   • Highest education level: Not on file   Tobacco Use   • Smoking status: Never Smoker   • Smokeless tobacco: Never Used   Substance and Sexual Activity   • Alcohol use: No     Frequency: Never   • Drug use: No   • Sexual activity: Defer           Objective   Physical Exam  Vitals signs and nursing note reviewed.   Constitutional:       General: She is not in acute distress.     Appearance: She is well-developed. She is not diaphoretic.      Comments: Well-appearing female in no acute distress   HENT:      Head: Normocephalic and atraumatic.   Eyes:      Pupils: Pupils are equal, round, and reactive to light.   Neck:      Musculoskeletal: Normal range of motion and neck supple.   Cardiovascular:      Rate and Rhythm: Normal rate and regular rhythm.      Heart sounds: Normal heart sounds. No murmur. No friction rub. No gallop.    Pulmonary:      Effort: Pulmonary effort is normal. No respiratory distress.      Breath sounds: Normal breath sounds. No wheezing or rales.   Abdominal:      General: Bowel sounds are normal. There is no distension.      Palpations: Abdomen is soft. There is no mass.      Tenderness: There is no abdominal tenderness. There is no guarding or rebound.   Musculoskeletal: Normal range of motion.      Right lower leg: No edema.      Left lower leg: No edema.   Skin:     General: Skin is warm and dry.      Findings: No erythema or rash.   Neurological:      Mental Status: She is alert and oriented to person, place, and time.   Psychiatric:         Thought Content: Thought content normal.         Judgment: Judgment normal.         Procedures           ED Course  ED  Course as of Oct 08 0427   u Oct 08, 2020   0034 50-year-old female presents for evaluation following syncope x2 tonight.  Of note, the patient has a long history of syncopal episodes.  She is followed by Dr. Man of cardiology.  She has a strong family history of hypertrophic cardiomyopathy.  She was scheduled to have a pacemaker placed on Monday but was told that if she had any events in the interim that she should come to the emergency department.  Tonight, she was at home at rest and had 2 unprovoked syncopal episodes, prompting her visit to the emergency department.  On arrival, the patient is nontoxic-appearing and is currently asymptomatic.    [DD]   0035 EKG revealed sinus rhythm with first-degree AV block, heart rate of 76,    [DD]   0035  and inferior Q waves (somewhat concerning for HOCM given the patient's family history).  No EKG evidence of Brugada syndrome.  Normal QT intervals.    [DD]   0036 Labs are unrevealing.  I discussed the patient's case with Dr. Chatterjee, and the patient will be admitted under his care for further evaluation, observation, and treatment and for cardiology consult tomorrow morning.    [DD]      ED Course User Index  [DD] Justin Carter MD                                   Recent Results (from the past 24 hour(s))   Comprehensive Metabolic Panel    Collection Time: 10/07/20 10:14 PM    Specimen: Blood   Result Value Ref Range    Glucose 92 65 - 99 mg/dL    BUN 13 6 - 20 mg/dL    Creatinine 0.84 0.57 - 1.00 mg/dL    Sodium 140 136 - 145 mmol/L    Potassium 3.8 3.5 - 5.2 mmol/L    Chloride 102 98 - 107 mmol/L    CO2 29.0 22.0 - 29.0 mmol/L    Calcium 9.5 8.6 - 10.5 mg/dL    Total Protein 7.2 6.0 - 8.5 g/dL    Albumin 4.60 3.50 - 5.20 g/dL    ALT (SGPT) 31 1 - 33 U/L    AST (SGOT) 24 1 - 32 U/L    Alkaline Phosphatase 78 39 - 117 U/L    Total Bilirubin 0.2 0.0 - 1.2 mg/dL    eGFR Non African Amer 71 >60 mL/min/1.73    Globulin 2.6 gm/dL    A/G Ratio 1.8 g/dL     BUN/Creatinine Ratio 15.5 7.0 - 25.0    Anion Gap 9.0 5.0 - 15.0 mmol/L   Protime-INR    Collection Time: 10/07/20 10:14 PM    Specimen: Blood   Result Value Ref Range    Protime 12.8 11.5 - 14.0 Seconds    INR 0.99 0.85 - 1.16   Troponin    Collection Time: 10/07/20 10:14 PM    Specimen: Blood   Result Value Ref Range    Troponin T <0.010 0.000 - 0.030 ng/mL   T4, Free    Collection Time: 10/07/20 10:14 PM    Specimen: Blood   Result Value Ref Range    Free T4 0.82 (L) 0.93 - 1.70 ng/dL   TSH    Collection Time: 10/07/20 10:14 PM    Specimen: Blood   Result Value Ref Range    TSH 3.760 0.270 - 4.200 uIU/mL   Magnesium    Collection Time: 10/07/20 10:14 PM    Specimen: Blood   Result Value Ref Range    Magnesium 2.2 1.6 - 2.6 mg/dL   CBC Auto Differential    Collection Time: 10/07/20 10:14 PM    Specimen: Blood   Result Value Ref Range    WBC 8.34 3.40 - 10.80 10*3/mm3    RBC 4.50 3.77 - 5.28 10*6/mm3    Hemoglobin 13.5 12.0 - 15.9 g/dL    Hematocrit 41.8 34.0 - 46.6 %    MCV 92.9 79.0 - 97.0 fL    MCH 30.0 26.6 - 33.0 pg    MCHC 32.3 31.5 - 35.7 g/dL    RDW 12.9 12.3 - 15.4 %    RDW-SD 44.0 37.0 - 54.0 fl    MPV 10.0 6.0 - 12.0 fL    Platelets 278 140 - 450 10*3/mm3    Neutrophil % 49.8 42.7 - 76.0 %    Lymphocyte % 36.6 19.6 - 45.3 %    Monocyte % 8.8 5.0 - 12.0 %    Eosinophil % 3.6 0.3 - 6.2 %    Basophil % 0.7 0.0 - 1.5 %    Immature Grans % 0.5 0.0 - 0.5 %    Neutrophils, Absolute 4.16 1.70 - 7.00 10*3/mm3    Lymphocytes, Absolute 3.05 0.70 - 3.10 10*3/mm3    Monocytes, Absolute 0.73 0.10 - 0.90 10*3/mm3    Eosinophils, Absolute 0.30 0.00 - 0.40 10*3/mm3    Basophils, Absolute 0.06 0.00 - 0.20 10*3/mm3    Immature Grans, Absolute 0.04 0.00 - 0.05 10*3/mm3    nRBC 0.0 0.0 - 0.2 /100 WBC   Respiratory Panel PCR w/COVID-19(SARS-CoV-2) [facility] In-House, [facility], 3-4 HR TAT - Swab, Nasopharynx    Collection Time: 10/07/20 11:23 PM    Specimen: Nasopharynx; Swab   Result Value Ref Range     ADENOVIRUS, PCR Not Detected Not Detected    Coronavirus 229E Not Detected Not Detected    Coronavirus HKU1 Not Detected Not Detected    Coronavirus NL63 Not Detected Not Detected    Coronavirus OC43 Not Detected Not Detected    COVID19 Not Detected Not Detected - Ref. Range    Human Metapneumovirus Not Detected Not Detected    Human Rhinovirus/Enterovirus Not Detected Not Detected    Influenza A PCR Not Detected Not Detected    Influenza A H1 Not Detected Not Detected    Influenza A H1 2009 PCR Not Detected Not Detected    Influenza A H3 Not Detected Not Detected    Influenza B PCR Not Detected Not Detected    Parainfluenza Virus 1 Not Detected Not Detected    Parainfluenza Virus 2 Not Detected Not Detected    Parainfluenza Virus 3 Not Detected Not Detected    Parainfluenza Virus 4 Not Detected Not Detected    RSV, PCR Not Detected Not Detected    Bordetella pertussis pcr Not Detected Not Detected    Bordetella parapertussis PCR Not Detected Not Detected    Chlamydophila pneumoniae PCR Not Detected Not Detected    Mycoplasma pneumo by PCR Not Detected Not Detected   Urinalysis With Microscopic If Indicated (No Culture) - Urine, Clean Catch    Collection Time: 10/07/20 11:26 PM    Specimen: Urine, Clean Catch   Result Value Ref Range    Color, UA Yellow Yellow, Straw    Appearance, UA Clear Clear    pH, UA 5.5 5.0 - 8.0    Specific Gravity, UA 1.012 1.001 - 1.030    Glucose, UA Negative Negative    Ketones, UA Negative Negative    Bilirubin, UA Negative Negative    Blood, UA Negative Negative    Protein, UA Negative Negative    Leuk Esterase, UA Negative Negative    Nitrite, UA Negative Negative    Urobilinogen, UA 0.2 E.U./dL 0.2 - 1.0 E.U./dL     Note: In addition to lab results from this visit, the labs listed above may include labs taken at another facility or during a different encounter within the last 24 hours. Please correlate lab times with ED admission and discharge times for further clarification of the  "services performed during this visit.    No orders to display     Vitals:    10/08/20 0030 10/08/20 0045 10/08/20 0122 10/08/20 0338   BP: 106/65 104/68 114/76 110/72   BP Location:   Right arm Left arm   Patient Position:    Lying   Pulse: 80 58 71 58   Resp:   20 17   Temp:   97.5 °F (36.4 °C) 98 °F (36.7 °C)   TempSrc:   Oral Oral   SpO2: 99% 95% 96%    Weight:   88 kg (193 lb 14.4 oz)    Height:   170.2 cm (67.01\")      Medications   sodium chloride 0.9 % flush 10 mL (has no administration in time range)   lisinopril (PRINIVIL,ZESTRIL) tablet 40 mg (has no administration in time range)   escitalopram (LEXAPRO) tablet 40 mg (has no administration in time range)   sodium chloride 0.9 % flush 10 mL ( Intravenous Canceled Entry 10/8/20 0304)   sodium chloride 0.9 % flush 10 mL (has no administration in time range)   acetaminophen (TYLENOL) tablet 650 mg (has no administration in time range)     Or   acetaminophen (TYLENOL) 160 MG/5ML solution 650 mg (has no administration in time range)     Or   acetaminophen (TYLENOL) suppository 650 mg (has no administration in time range)   ondansetron (ZOFRAN) tablet 4 mg (has no administration in time range)     Or   ondansetron (ZOFRAN) injection 4 mg (has no administration in time range)   sodium chloride 0.9 % bolus 500 mL (0 mL Intravenous Stopped 10/7/20 2322)   metoclopramide (REGLAN) injection 5 mg (5 mg Intravenous Given 10/7/20 2323)     ECG/EMG Results (last 24 hours)     Procedure Component Value Units Date/Time    ECG 12 Lead [[id number]] Collected: 10/07/20 2154     Updated: 10/07/20 2153        ECG 12 Lead         ECG 12 Lead    (Results Pending)               MDM    Final diagnoses:   Syncope, unspecified syncope type            Justin Carter MD  10/08/20 0429    "

## 2020-10-08 NOTE — PROCEDURES
PRE-ELECTROPHYSIOLOGY STUDY DIAGNOSES:  1. Bradycardia due to nonreversible symptomatic sinus node dysfunction and AV Block with HR<60 BPM  2. Syncope  3. Previous Loop Recorder implant    PROCEDURE PERFORMED:  1. Insertion of a [employer] DDDR pacemaker.  2. Loop Recorder Explant  3. Moderate sedation    Anesthesia: Cath lab moderate sedation    I was present with the patient for the duration of moderate sedation and supervised staff who had no other duties and monitored the patient for the entire procedure     Name of independent trained observer: Marian Scott RN  Intra-Service start time: 1259  Intra-Service end time: 1404    Estimated Blood Loss: Less than 10 mL     Specimens: None     PROCEDURE IN DETAIL: The patient was brought into the EP lab in a fasting  state. The left shoulder was prepped and draped in the usual sterile  fashion. Skin anesthetized with lidocaine with epinephrine. Incision made  in the region of the deltopectoral groove. Pocket was made for the  pacemaker. Access obtained in the left subclavian vein via  the Seldinger technique x 2 over which 2 separate guidewires were placed.  Over the first guidewire, a 7-[place]an sheath was placed through which a  [employer] right ventricle lead, model Tendril , 52 cm was placed. The lead  achieved the following values: R waves 12mV, threshold  was 0.5 V at 0.5 msec pulse width. This lead was then secured to the  pectoral fascia with 0 Ti-Cron x2. Over the second guidewire, a 7-[place] sheath was placed through which a [employer] right atrial lead, model Tendril , 46 cm was placed, which achieved the following  values: P waves were 1.8mV, threshold was 1.25 volt at 0.4msec pulse width.  This lead was then secured to the pectoral fascia with 0 Ti-Cron x2.  Pocket was irrigated with triple antibiotic flush. The leads were  connected to a [employer] pacemaker, model [employer]urity MRI  serial #[id number]. The leads and pacemaker were then placed  in the pocket area.  Fascial layer was closed with 2-0 Vicryl, followed by  a next layer of 3-0 Vicryl, followed by a superficial layer of staples.  The wound was dressed. Skin anesthetized with lidocaine with epinephrine over the loop recorder site. Incision made  in the region of the loop recorder. It was removed. The superficial layer was closed with staples.  The wound was dressed. The patient recovered from his sedation and  transferred from the lab in a stable condition.    The patient recovered from his sedation and transferred from the lab in a stable condition.        IMPRESSION:   1. Successful implantation of a [employer] pacemaker for treatment of symptomatic sinus node dysfunction and AV Block with associated bradycardia  2. Loop Recorder removal     FOLLOW UP PLAN:  1. Wound check in 12-14 days for staple removal

## 2020-10-08 NOTE — H&P
Psychiatric Medicine Services  HISTORY AND PHYSICAL    Patient Name: Yamilet Allan  : 1970  MRN: [id number]  Primary Care Physician: Jaimee Bobo MD  Date of admission: 10/7/2020      Subjective   Subjective     Chief Complaint:  syncope    HPI:  Yamilet Allan is a 50 y.o. female with a history of anxiety, depression, cardiomyopathy, HLD, HTN, stroke, recurrent syncope, AV block, tachybradycardia syndrome, presents to the ED with complaints of syncope.  Patient is scheduled for a Pacemaker-[employer] DDDR on Monday with Dr. Man for transient AV block with associated symptomatic bradycardia.  Patient reports that this morning she had 2 syncopal episodes.  She is unsure of how long she had loss of consciousness.  After first episode she called her friend to come and take her to the hospital, upon friends arrival patient was unconscious on the floor for unknown amount of time.  Patient states that she has not felt well all day today.  She has been experiencing intermittent chest pain, fatigue, shortness of breath, and nausea.  She denies any fever, cough, vomiting, abdominal pain, edema, dysuria, or any other complaints at this time.  Troponin <0.010.  EKG shows first-degree AV block.  Patient is to be admitted to the hospital medicine service for further evaluation and management.    Review of Systems   Constitutional: Positive for fatigue. Negative for appetite change, chills, diaphoresis and fever.   HENT: Negative.    Eyes: Negative.    Respiratory: Positive for shortness of breath. Negative for cough.    Cardiovascular: Positive for chest pain. Negative for palpitations and leg swelling.   Gastrointestinal: Positive for nausea. Negative for abdominal distention, abdominal pain, diarrhea and vomiting.   Endocrine: Negative.    Genitourinary: Negative.    Musculoskeletal: Negative.    Skin: Negative.    Allergic/Immunologic: Negative.    Neurological: Negative.     Hematological: Negative.    Psychiatric/Behavioral: Negative.           Personal History     Past Medical History:   Diagnosis Date   • Anxiety and depression    • Asthma    • Cardiomyopathy (CMS/HCC)    • Hyperlipidemia    • Hypertension    • Menopause    • Stroke (CMS/HCC)        Past Surgical History:   Procedure Laterality Date   • ADENOIDECTOMY     •  SECTION     • COLONOSCOPY     • OTHER SURGICAL HISTORY      LOOP RECORDER   • RHINOPLASTY     • TONSILLECTOMY         Family History: family history includes Cardiomyopathy in her sister; Diabetes in her sister; Heart attack in her brother and sister; Heart failure in her father; Hyperlipidemia in her brother and brother; Hypertension in her brother, brother, mother, sister, sister, sister, sister, and sister; No Known Problems in her sister and sister; Stroke in her brother and brother. Otherwise pertinent FHx was reviewed and unremarkable.     Social History:  reports that she has never smoked. She has never used smokeless tobacco. She reports that she does not drink alcohol or use drugs.  Social History     Social History Narrative   • Not on file       Medications:  Available home medication information reviewed.  (Not in a hospital admission)      Allergies   Allergen Reactions   • Bee Venom Anaphylaxis   • Cat Hair Extract Anaphylaxis   • Molds & Smuts Anaphylaxis   • Penicillins Anaphylaxis       Objective   Objective     Vital Signs:   Temp:  [96.8 °F (36 °C)] 96.8 °F (36 °C)  Heart Rate:  [61-84] 61  Resp:  [20] 20  BP: (125-149)/() 133/90        Physical Exam  Constitutional:       General: She is not in acute distress.     Appearance: She is not toxic-appearing or diaphoretic.   HENT:      Head: Normocephalic.      Nose: Nose normal.      Mouth/Throat:      Mouth: Mucous membranes are moist.   Eyes:      Pupils: Pupils are equal, round, and reactive to light.   Neck:      Musculoskeletal: Normal range of motion and neck supple.    Cardiovascular:      Rate and Rhythm: Normal rate and regular rhythm.      Pulses: Normal pulses.      Heart sounds: Normal heart sounds. No murmur. No friction rub. No gallop.    Pulmonary:      Effort: Respiratory distress (mild dyspnea with conversation) present.      Breath sounds: Normal breath sounds. No wheezing, rhonchi or rales.   Abdominal:      General: Bowel sounds are normal. There is no distension.      Palpations: Abdomen is soft.      Tenderness: There is no abdominal tenderness. There is no guarding or rebound.   Musculoskeletal: Normal range of motion.         General: No swelling, tenderness or signs of injury.   Skin:     General: Skin is warm and dry.      Coloration: Skin is not pale.      Findings: No erythema or rash.   Neurological:      General: No focal deficit present.      Mental Status: She is alert and oriented to person, place, and time.      Cranial Nerves: No cranial nerve deficit.      Sensory: No sensory deficit.   Psychiatric:         Mood and Affect: Mood normal.         Behavior: Behavior normal.         Thought Content: Thought content normal.         Judgment: Judgment normal.            Results Reviewed:  I have personally reviewed current lab and radiology data.    Results from last 7 days   Lab Units 10/07/20  2214   WBC 10*3/mm3 8.34   HEMOGLOBIN g/dL 13.5   HEMATOCRIT % 41.8   PLATELETS 10*3/mm3 278   INR  0.99     Results from last 7 days   Lab Units 10/07/20  2214   SODIUM mmol/L 140   POTASSIUM mmol/L 3.8   CHLORIDE mmol/L 102   CO2 mmol/L 29.0   BUN mg/dL 13   CREATININE mg/dL 0.84   GLUCOSE mg/dL 92   CALCIUM mg/dL 9.5   ALT (SGPT) U/L 31   AST (SGOT) U/L 24   TROPONIN T ng/mL <0.010     Estimated Creatinine Clearance: 85.2 mL/min (by C-G formula based on SCr of 0.84 mg/dL).  Brief Urine Lab Results     None        Imaging Results (Last 24 Hours)     ** No results found for the last 24 hours. **        Results for orders placed during the hospital encounter of  10/05/20   Adult Transthoracic Echo Complete W/ Cont if Necessary Per Protocol    Narrative · Left ventricular ejection fraction appears to be 61 - 65%. Left   ventricular systolic function is normal.          Assessment/Plan   Assessment & Plan     Active Hospital Problems    Diagnosis POA   • **Syncope [R55] Yes   • HLD (hyperlipidemia) [E78.5] Yes   • AV block [I44.30] Yes   • Cardiomyopathy (CMS/HCC) [I42.9] Yes   • Essential hypertension [I10] Yes     Yamilet Allan is a 50 y.o. female with a history of anxiety, depression, cardiomyopathy, HLD, HTN, stroke, recurrent syncope, AV block, tachybradycardia syndrome, presents to the ED with complaints of syncope.  Patient is scheduled for a Pacemaker-[employer] DDDR on Monday with Dr. Man for transient AV block with associated symptomatic bradycardia.   Troponin <0.010.  EKG shows first-degree AV block.      Assessment and Plan:    Syncope  AV Block  Cardiomyopathy  Tachybradycardia syndrome   HTN  HLD  --fall precautions  --consult Dr. Man in the am  --npo after midnight  --EKG in the am  --pacer pads in place   --continue lisinopril  --am labs  **Respiratory panel PCR w/COVID-19    DVT prophylaxis:  mechanical      CODE STATUS:  full  Code Status and Medical Interventions:   Ordered at: 10/08/20 0003     Level Of Support Discussed With:    Patient     Code Status:    CPR     Medical Interventions (Level of Support Prior to Arrest):    Full       Admission Status:  I believe this patient meets INPATIENT status due to syncope, need for pacer.  I feel patient’s risk for adverse outcomes and need for care warrant INPATIENT evaluation and I predict the patient’s care encounter to likely last beyond 2 midnights.      Electronically signed by LUIS Wylie, 10/08/20, 12:04 AM EDT.       LUIS Wylie  10/08/20         Attending   Admission Attestation       I have seen and examined the patient, performing an independent face-to-face  diagnostic evaluation with plan of care reviewed and developed with the advanced practice clinician (APC).      Brief Summary Statement:   Yamilet Allan is a 50 y.o. female with past medical history of hypertension, hyperlipidemia, stroke, recurrent syncope, cardiomyopathy, anxiety, depression, AV block, tachybradycardia syndrome who presents to the ER with complaints of syncope x2 earlier this evening.    Patient states that she has been following with Dr. Man of cardiology for transient AV block with symptomatic bradycardia.  Patient reports that she had 2 episodes of syncope today with loss of consciousness.  No reported injury.  She was found unconscious on the floor for unknown amount of time by friends.  Patient reports she has been having intermittent chest pain, fatigue, shortness of air, and nausea that cardiology has been aware of.  She reports she has not felt well overall today.  Denies any cough, shortness of air, exposure to COVID-19, or any other symptoms.    Patient admitted for cardiology evaluation and consideration of pacemaker placement.  Remainder of detailed HPI is as noted by APC and has been reviewed and/or edited by me for completeness.    Attending Physical Exam:  Constitutional: Awake, alert  Eyes: PERRLA, sclerae anicteric, no conjunctival injection  HENT: NCAT, mucous membranes moist  Neck: Supple, no thyromegaly, no lymphadenopathy, trachea midline  Respiratory: Clear to auscultation bilaterally, nonlabored respirations   Cardiovascular: RRR, no murmurs, rubs, or gallops, palpable pedal pulses bilaterally  Gastrointestinal: Positive bowel sounds, soft, nontender, nondistended  Musculoskeletal: No bilateral ankle edema, no clubbing or cyanosis to extremities  Psychiatric: Appropriate affect, cooperative  Neurologic: Oriented x 3, strength symmetric in all extremities, Cranial Nerves grossly intact to confrontation, speech clear  Skin: No rashes      Brief Assessment/Plan :  See  detailed assessment and plan developed with [facility] which I have reviewed and/or edited for completeness.        Admission Status: I believe that this patient meets INPATIENT status due to syncope, consideration of pacemaker placement.  I feel patient’s risk for adverse outcomes and need for care warrant INPATIENT evaluation and I predict the patient’s care encounter to likely last beyond 2 midnights.        Wilber Chatterjee, DO  10/08/20

## 2020-10-08 NOTE — PROGRESS NOTES
Discharge Planning Assessment  [facility]     Patient Name: Yamilet Allan  MRN: [id number]  Today's Date: 10/8/2020    Admit Date: 10/7/2020    Discharge Needs Assessment     Row Name 10/08/20 1049       Living Environment    Lives With  alone    Current Living Arrangements  home/apartment/condo    Primary Care Provided by  self    Provides Primary Care For  no one    Family Caregiver if Needed  significant other    Quality of Family Relationships  unable to assess    Able to Return to Prior Arrangements  yes       Resource/Environmental Concerns    Resource/Environmental Concerns  none       Transition Planning    Patient/Family Anticipates Transition to  home    Patient/Family Anticipated Services at Transition      Transportation Anticipated  family or friend will provide       Discharge Needs Assessment    Readmission Within the Last 30 Days  no previous admission in last 30 days    Equipment Currently Used at Home  none    Concerns to be Addressed  discharge planning    Anticipated Changes Related to Illness  none    Equipment Needed After Discharge  none        Discharge Plan     Row Name 10/08/20 1050       Plan    Plan  Home    Patient/Family in Agreement with Plan  yes    Plan Comments  Spoke with patient in room to initiate discharge planning.  She lives alone in [facility].  She is independent with ADL's.  She has no DME at home and is not current with home health.  Ms. Allan has RX coverage and has her scripts filled at [facility].  Her plan is to return home at discharge.  She denies any needs at this time.  CM will continue to follow.  Paula Woodard RN [phone]    Final Discharge Disposition Code  01 - home or self-care        Continued Care and Services - Admitted Since 10/7/2020    Coordination has not been started for this encounter.       Expected Discharge Date and Time     Expected Discharge Date Expected Discharge Time    Oct 13, 2020         Demographic Summary     Row Name  10/08/20 1048       General Information    Admission Type  inpatient    Arrived From  emergency department    Referral Source  admission list    Reason for Consult  discharge planning    Preferred Language  English     Used During This Interaction  no    General Information Comments  PCP- Jaimee Bobo        Functional Status     Row Name 10/08/20 1048       Functional Status    Usual Activity Tolerance  good    Current Activity Tolerance  good       Functional Status, IADL    Medications  independent    Meal Preparation  independent    Housekeeping  independent    Laundry  independent    Shopping  independent        Psychosocial    No documentation.       Abuse/Neglect    No documentation.       Legal     Row Name 10/08/20 1049       Financial/Legal    Finance Comments  Verified with patient that she has [facility] Medicaid.  No issues obtaining medications.        Substance Abuse    No documentation.       Patient Forms    No documentation.           Paula Woodard RN

## 2020-10-08 NOTE — CONSULTS
Yamilet Allan  1970  PCP: Jaimee Bobo MD    SUBJECTIVE:   Yamilet Allan is a 50 y.o. female seen for a consultation visit regarding the following:     Chief Complaint:   Chief Complaint   Patient presents with   • Chest Pain          Consultation is requested by Jaimee Bobo MD for evaluation of Chest Pain        History:  This is a 50-year-old patient referred by Dr. Veto Atwood for further evaluation management of syncope with associated complete AV block.  The patient reports that she has had issues since October 2015 when she had a cerebral vascular accident.  She is had great fluctuation in her heart rate over the years.  She reports that she now is experiencing tachybradycardia syndrome on a daily basis.  She also had episodes of near syncope where she will have sudden near transient loss of consciousness.  She has implantable loop recorder that is documented transient complete AV block.  She presented to the emergency room last night after having recurrent syncopal events.      Cardiac PMH: (Old records have been reviewed and summarized below)  1.  CVA-October 2015  2.  Tachybradycardia syndrome  3.  Near syncope  4.  [employer] loop implant  5.  Documented transient complete AV block  6.  Echo-October 2020-normal ejection fraction    Past Medical History, Past Surgical History, Family history, Social History, and Medications were all reviewed with the patient today and updated as necessary.       Current Facility-Administered Medications:   •  acetaminophen (TYLENOL) tablet 650 mg, 650 mg, Oral, Q4H PRN, 650 mg at 10/08/20 0653 **OR** acetaminophen (TYLENOL) 160 MG/5ML solution 650 mg, 650 mg, Oral, Q4H PRN **OR** acetaminophen (TYLENOL) suppository 650 mg, 650 mg, Rectal, Q4H PRN, Giulia Alexander, APRN  •  escitalopram (LEXAPRO) tablet 40 mg, 40 mg, Oral, Daily, Giulia Alexander, APRN, 40 mg at 10/08/20 0855  •  lisinopril (PRINIVIL,ZESTRIL) tablet 40 mg, 40 mg, Oral, Daily, Catherine  Giulia GARCIA APRN, 40 mg at 10/08/20 0855  •  ondansetron (ZOFRAN) tablet 4 mg, 4 mg, Oral, Q6H PRN **OR** ondansetron (ZOFRAN) injection 4 mg, 4 mg, Intravenous, Q6H PRN, Giulia Alexander, APRN  •  [COMPLETED] Insert peripheral IV, , , Once **AND** sodium chloride 0.9 % flush 10 mL, 10 mL, Intravenous, PRN, Giulia Alexander, APRN  •  sodium chloride 0.9 % flush 10 mL, 10 mL, Intravenous, Q12H, Giulia Alexander APRN, 10 mL at 10/08/20 0900  •  sodium chloride 0.9 % flush 10 mL, 10 mL, Intravenous, PRN, Giulia Alexander, APRN    Allergies   Allergen Reactions   • Bee Venom Anaphylaxis   • Cat Hair Extract Anaphylaxis   • Molds & Smuts Anaphylaxis   • Penicillins Anaphylaxis         Past Medical History:   Diagnosis Date   • Anxiety and depression    • Asthma    • Cardiomyopathy (CMS/HCC)    • Hyperlipidemia    • Hypertension    • Menopause    • Stroke (CMS/HCC)      Past Surgical History:   Procedure Laterality Date   • ADENOIDECTOMY     •  SECTION     • COLONOSCOPY     • OTHER SURGICAL HISTORY      LOOP RECORDER   • RHINOPLASTY     • TONSILLECTOMY       Family History   Problem Relation Age of Onset   • Hypertension Mother    • Heart failure Father    • Cardiomyopathy Sister    • Heart attack Sister    • Diabetes Sister    • Stroke Brother    • Heart attack Brother    • Hypertension Brother    • Hyperlipidemia Brother    • Stroke Brother    • Hyperlipidemia Brother    • Hypertension Brother    • Hypertension Sister    • Hypertension Sister    • Hypertension Sister    • Hypertension Sister    • Hypertension Sister    • No Known Problems Sister    • No Known Problems Sister      Social History     Tobacco Use   • Smoking status: Never Smoker   • Smokeless tobacco: Never Used   Substance Use Topics   • Alcohol use: No     Frequency: Never       ROS:  Review of Systems:  General: no recent weight loss/gain, weakness or fatigue  Skin: no rashes, lumps, or other skin changes  HEENT: no dizziness,  "lightheadedness, or vision changes  Respiratory: no cough or hemoptysis  Cardiovascular: + palpitations, and tachycardia  Gastrointestinal: no black/tarry stools or diarrhea  Urinary: no change in frequency or urgency  Peripheral Vascular: no claudication or leg cramps  Musculoskeletal: no muscle or joint pain/stiffness  Psychiatric: no depression or excessive stress  Neurological: + syncope  Hematologic: no anemia, easy bruising or bleeding  Endocrine: no thyroid problems, nor heat or cold intolerance    PHYSICAL EXAM:   /70   Pulse 69   Temp 97.7 °F (36.5 °C) (Oral)   Resp 18   Ht 170.2 cm (67.01\")   Wt 88 kg (193 lb 14.4 oz)   SpO2 97%   BMI 30.36 kg/m²      Wt Readings from Last 5 Encounters:   10/08/20 88 kg (193 lb 14.4 oz)   10/06/20 84.9 kg (187 lb 3.2 oz)   09/08/20 82.1 kg (181 lb)   01/14/20 87.5 kg (193 lb)   10/22/19 73.5 kg (162 lb)     BP Readings from Last 5 Encounters:   10/08/20 104/70   10/06/20 110/82   09/08/20 114/76   01/14/20 122/86   10/22/19 122/84       General-Well Nourished, Well developed  Eyes - PERRLA  Neck- supple, No mass  CV- regular rate and rhythm, no MRG  Lung- clear bilaterally  Abd- soft, +BS  Musc/skel - Norm strength and range of motion  Skin- warm and dry  Neuro - Alert & Oriented x 3, appropriate mood.    Medical problems and test results were reviewed with the patient today.     Results for orders placed or performed during the hospital encounter of 10/07/20   Respiratory Panel PCR w/COVID-19(SARS-CoV-2) [facility] In-House, NP Swab in UTM/VTM, 3-4 HR TAT - Swab, Nasopharynx    Specimen: Nasopharynx; Swab   Result Value Ref Range    ADENOVIRUS, PCR Not Detected Not Detected    Coronavirus 229E Not Detected Not Detected    Coronavirus HKU1 Not Detected Not Detected    Coronavirus NL63 Not Detected Not Detected    Coronavirus OC43 Not Detected Not Detected    COVID19 Not Detected Not Detected - Ref. Range    Human Metapneumovirus Not Detected Not " Detected    Human Rhinovirus/Enterovirus Not Detected Not Detected    Influenza A PCR Not Detected Not Detected    Influenza A H1 Not Detected Not Detected    Influenza A H1 2009 PCR Not Detected Not Detected    Influenza A H3 Not Detected Not Detected    Influenza B PCR Not Detected Not Detected    Parainfluenza Virus 1 Not Detected Not Detected    Parainfluenza Virus 2 Not Detected Not Detected    Parainfluenza Virus 3 Not Detected Not Detected    Parainfluenza Virus 4 Not Detected Not Detected    RSV, PCR Not Detected Not Detected    Bordetella pertussis pcr Not Detected Not Detected    Bordetella parapertussis PCR Not Detected Not Detected    Chlamydophila pneumoniae PCR Not Detected Not Detected    Mycoplasma pneumo by PCR Not Detected Not Detected   Comprehensive Metabolic Panel    Specimen: Blood   Result Value Ref Range    Glucose 92 65 - 99 mg/dL    BUN 13 6 - 20 mg/dL    Creatinine 0.84 0.57 - 1.00 mg/dL    Sodium 140 136 - 145 mmol/L    Potassium 3.8 3.5 - 5.2 mmol/L    Chloride 102 98 - 107 mmol/L    CO2 29.0 22.0 - 29.0 mmol/L    Calcium 9.5 8.6 - 10.5 mg/dL    Total Protein 7.2 6.0 - 8.5 g/dL    Albumin 4.60 3.50 - 5.20 g/dL    ALT (SGPT) 31 1 - 33 U/L    AST (SGOT) 24 1 - 32 U/L    Alkaline Phosphatase 78 39 - 117 U/L    Total Bilirubin 0.2 0.0 - 1.2 mg/dL    eGFR Non African Amer 71 >60 mL/min/1.73    Globulin 2.6 gm/dL    A/G Ratio 1.8 g/dL    BUN/Creatinine Ratio 15.5 7.0 - 25.0    Anion Gap 9.0 5.0 - 15.0 mmol/L   Protime-INR    Specimen: Blood   Result Value Ref Range    Protime 12.8 11.5 - 14.0 Seconds    INR 0.99 0.85 - 1.16   Urinalysis With Microscopic If Indicated (No Culture) - Urine, Clean Catch    Specimen: Urine, Clean Catch   Result Value Ref Range    Color, UA Yellow Yellow, Straw    Appearance, UA Clear Clear    pH, UA 5.5 5.0 - 8.0    Specific Gravity, UA 1.012 1.001 - 1.030    Glucose, UA Negative Negative    Ketones, UA Negative Negative    Bilirubin, UA Negative Negative    Blood,  UA Negative Negative    Protein, UA Negative Negative    Leuk Esterase, UA Negative Negative    Nitrite, UA Negative Negative    Urobilinogen, UA 0.2 E.U./dL 0.2 - 1.0 E.U./dL   Troponin    Specimen: Blood   Result Value Ref Range    Troponin T <0.010 0.000 - 0.030 ng/mL   T4, Free    Specimen: Blood   Result Value Ref Range    Free T4 0.82 (L) 0.93 - 1.70 ng/dL   TSH    Specimen: Blood   Result Value Ref Range    TSH 3.760 0.270 - 4.200 uIU/mL   Magnesium    Specimen: Blood   Result Value Ref Range    Magnesium 2.2 1.6 - 2.6 mg/dL   CBC Auto Differential    Specimen: Blood   Result Value Ref Range    WBC 8.34 3.40 - 10.80 10*3/mm3    RBC 4.50 3.77 - 5.28 10*6/mm3    Hemoglobin 13.5 12.0 - 15.9 g/dL    Hematocrit 41.8 34.0 - 46.6 %    MCV 92.9 79.0 - 97.0 fL    MCH 30.0 26.6 - 33.0 pg    MCHC 32.3 31.5 - 35.7 g/dL    RDW 12.9 12.3 - 15.4 %    RDW-SD 44.0 37.0 - 54.0 fl    MPV 10.0 6.0 - 12.0 fL    Platelets 278 140 - 450 10*3/mm3    Neutrophil % 49.8 42.7 - 76.0 %    Lymphocyte % 36.6 19.6 - 45.3 %    Monocyte % 8.8 5.0 - 12.0 %    Eosinophil % 3.6 0.3 - 6.2 %    Basophil % 0.7 0.0 - 1.5 %    Immature Grans % 0.5 0.0 - 0.5 %    Neutrophils, Absolute 4.16 1.70 - 7.00 10*3/mm3    Lymphocytes, Absolute 3.05 0.70 - 3.10 10*3/mm3    Monocytes, Absolute 0.73 0.10 - 0.90 10*3/mm3    Eosinophils, Absolute 0.30 0.00 - 0.40 10*3/mm3    Basophils, Absolute 0.06 0.00 - 0.20 10*3/mm3    Immature Grans, Absolute 0.04 0.00 - 0.05 10*3/mm3    nRBC 0.0 0.0 - 0.2 /100 WBC   Troponin    Specimen: Blood   Result Value Ref Range    Troponin T <0.010 0.000 - 0.030 ng/mL   CBC Auto Differential    Specimen: Blood   Result Value Ref Range    WBC 6.90 3.40 - 10.80 10*3/mm3    RBC 4.24 3.77 - 5.28 10*6/mm3    Hemoglobin 12.7 12.0 - 15.9 g/dL    Hematocrit 40.3 34.0 - 46.6 %    MCV 95.0 79.0 - 97.0 fL    MCH 30.0 26.6 - 33.0 pg    MCHC 31.5 31.5 - 35.7 g/dL    RDW 13.0 12.3 - 15.4 %    RDW-SD 44.9 37.0 - 54.0 fl    MPV 10.1 6.0 - 12.0 fL     Platelets 246 140 - 450 10*3/mm3    Neutrophil % 46.1 42.7 - 76.0 %    Lymphocyte % 37.0 19.6 - 45.3 %    Monocyte % 11.2 5.0 - 12.0 %    Eosinophil % 4.6 0.3 - 6.2 %    Basophil % 0.7 0.0 - 1.5 %    Immature Grans % 0.4 0.0 - 0.5 %    Neutrophils, Absolute 3.18 1.70 - 7.00 10*3/mm3    Lymphocytes, Absolute 2.55 0.70 - 3.10 10*3/mm3    Monocytes, Absolute 0.77 0.10 - 0.90 10*3/mm3    Eosinophils, Absolute 0.32 0.00 - 0.40 10*3/mm3    Basophils, Absolute 0.05 0.00 - 0.20 10*3/mm3    Immature Grans, Absolute 0.03 0.00 - 0.05 10*3/mm3    nRBC 0.0 0.0 - 0.2 /100 WBC   Basic Metabolic Panel    Specimen: Blood   Result Value Ref Range    Glucose 89 65 - 99 mg/dL    BUN 13 6 - 20 mg/dL    Creatinine 0.84 0.57 - 1.00 mg/dL    Sodium 138 136 - 145 mmol/L    Potassium 4.2 3.5 - 5.2 mmol/L    Chloride 103 98 - 107 mmol/L    CO2 23.0 22.0 - 29.0 mmol/L    Calcium 8.8 8.6 - 10.5 mg/dL    eGFR Non African Amer 71 >60 mL/min/1.73    BUN/Creatinine Ratio 15.5 7.0 - 25.0    Anion Gap 12.0 5.0 - 15.0 mmol/L         No results found for: CHOL, HDL, HDLC, LDL, LDLC, VLDL    EKG:  (EKG/Tracing has been independently visualized by me and summarized below)          ASSESSMENT and PLAN  1.  Transient AV block-has associated symptomatic bradycardia.  We will plan for dual-chamber pacemaker implantation today. We discussed the procedure in great detail including risks, benefits, and alternatives. The patient understands that risks include bleeding, infection, stroke, MI, cardiac perforation, and even death.  No clear etiology at this time of the transient AV block.  May consider cardiac MRI in the future for further evaluation.  2.  Tachybradycardia syndrome-consider the addition of a low-dose beta-blocker post device implantation  3.  Syncope-appears to be associated with transient complete AV block-plan for a dual-chamber pacemaker.    4.  Pre-op with Vancomycin.         Lewis Man M.D., F[facility],  [facility]  Cardiology/Electrophysiology  10/08/20  09:40 EDT

## 2020-10-08 NOTE — DISCHARGE SUMMARY
Physician Discharge Summary     Patient ID:  Yamilet Allan  [id number]  50 y.o.  1970    Admit date: 10/7/2020    Discharge date and time: No discharge date for patient encounter.     Admitting Physician: Eva Gastelum MD     Primary Physician: Jaimee Bobo MD    Discharge Physician: Lewis Man MD    Admission Diagnoses: Syncope [R55]  Syncope [R55]    Discharge Diagnoses:   Patient Active Problem List    Diagnosis   • *Syncope [R55]   • HLD (hyperlipidemia) [E78.5]   • AV block [I44.30]   • Cardiomyopathy (CMS/HCC) [I42.9]   • AV block, complete (CMS/HCC) [I44.2]   • Syncope and collapse [R55]   • Essential hypertension [I10]       Cardiology Procedures this admission:    1.  [employer] dual-chamber pacemaker placement    Hospital Course: Patient has had a history of recurrent syncope due to transient AV block.  As result she had a dual-chamber pacemaker placed.  Post device CXR and device check were stable. Please see operation report for full implant details.    Discharge Exam:    Vitals:    10/08/20 1804   BP: 108/87   Pulse: 85   Resp: 18   Temp:    SpO2: 93%      General-Well Nourished, Well developed  Eyes - PERRLA  Neck- supple, No mass  CV- regular rate and rhythm, no MRG, No edema  Lung- clear bilaterally  Abd- soft, +BS  Musc/skel - Norm strength and range of motion  Skin- warm and dry  Neuro - Alert & Oriented x 3, appropriate mood.    Disposition: Patient will be discharged home    Patient discharge medications:      Your medication list      START taking these medications      Instructions Last Dose Given Next Dose Due   metoprolol succinate XL 25 MG 24 hr tablet  Commonly known as: TOPROL-XL      Take 1 tablet by mouth Daily.       oxyCODONE-acetaminophen 5-325 MG per tablet  Commonly known as: Percocet      Take 1 tablet by mouth Every 6 (Six) Hours As Needed for Moderate Pain .          CONTINUE taking these medications      Instructions Last Dose Given Next Dose Due   albuterol 0.63  MG/3ML nebulizer solution  Commonly known as: ACCUNEB      Take 3 mL by nebulization Every 6 (Six) Hours As Needed for Wheezing.       EPINEPHrine 0.3 MG/0.3ML solution auto-injector injection  Commonly known as: EPIPEN      As Needed.       escitalopram 20 MG tablet  Commonly known as: LEXAPRO      Take 40 mg by mouth Daily.       lisinopril 40 MG tablet  Commonly known as: PRINIVIL,ZESTRIL      Take 40 mg by mouth Daily.       Prenatal 28-0.8 MG tablet      Take  by mouth Daily.             Where to Get Your Medications      You can get these medications from any pharmacy    Bring a paper prescription for each of these medications  · metoprolol succinate XL 25 MG 24 hr tablet  · oxyCODONE-acetaminophen 5-325 MG per tablet         Referenced discharge instructions provided by nursing for diet and activity.    Follow-up with [facility] in 12 to 14 days    Signed:  Lewis Man MD  10/8/2020  18:11 EDT

## 2020-10-09 ENCOUNTER — APPOINTMENT (OUTPATIENT)
Dept: PREADMISSION TESTING | Facility: HOSPITAL | Age: 50
End: 2020-10-09

## 2020-10-09 VITALS
TEMPERATURE: 97.9 F | HEIGHT: 67 IN | WEIGHT: 193.9 LBS | HEART RATE: 94 BPM | BODY MASS INDEX: 30.43 KG/M2 | DIASTOLIC BLOOD PRESSURE: 82 MMHG | OXYGEN SATURATION: 96 % | SYSTOLIC BLOOD PRESSURE: 118 MMHG | RESPIRATION RATE: 18 BRPM

## 2020-10-09 LAB
ANION GAP SERPL CALCULATED.3IONS-SCNC: 11 MMOL/L (ref 5–15)
BASOPHILS # BLD AUTO: 0.02 10*3/MM3 (ref 0–0.2)
BASOPHILS NFR BLD AUTO: 0.2 % (ref 0–1.5)
BUN SERPL-MCNC: 14 MG/DL (ref 6–20)
BUN/CREAT SERPL: 17.5 (ref 7–25)
CALCIUM SPEC-SCNC: 9.8 MG/DL (ref 8.6–10.5)
CHLORIDE SERPL-SCNC: 102 MMOL/L (ref 98–107)
CO2 SERPL-SCNC: 26 MMOL/L (ref 22–29)
CREAT SERPL-MCNC: 0.8 MG/DL (ref 0.57–1)
DEPRECATED RDW RBC AUTO: 43.9 FL (ref 37–54)
EOSINOPHIL # BLD AUTO: 0 10*3/MM3 (ref 0–0.4)
EOSINOPHIL NFR BLD AUTO: 0 % (ref 0.3–6.2)
ERYTHROCYTE [DISTWIDTH] IN BLOOD BY AUTOMATED COUNT: 12.7 % (ref 12.3–15.4)
GFR SERPL CREATININE-BSD FRML MDRD: 75 ML/MIN/1.73
GLUCOSE BLDC GLUCOMTR-MCNC: 139 MG/DL (ref 70–130)
GLUCOSE SERPL-MCNC: 170 MG/DL (ref 65–99)
HCT VFR BLD AUTO: 43.7 % (ref 34–46.6)
HGB BLD-MCNC: 14.1 G/DL (ref 12–15.9)
IMM GRANULOCYTES # BLD AUTO: 0.05 10*3/MM3 (ref 0–0.05)
IMM GRANULOCYTES NFR BLD AUTO: 0.4 % (ref 0–0.5)
LYMPHOCYTES # BLD AUTO: 1.08 10*3/MM3 (ref 0.7–3.1)
LYMPHOCYTES NFR BLD AUTO: 9.4 % (ref 19.6–45.3)
MCH RBC QN AUTO: 30.5 PG (ref 26.6–33)
MCHC RBC AUTO-ENTMCNC: 32.3 G/DL (ref 31.5–35.7)
MCV RBC AUTO: 94.4 FL (ref 79–97)
MONOCYTES # BLD AUTO: 0.23 10*3/MM3 (ref 0.1–0.9)
MONOCYTES NFR BLD AUTO: 2 % (ref 5–12)
NEUTROPHILS NFR BLD AUTO: 10.16 10*3/MM3 (ref 1.7–7)
NEUTROPHILS NFR BLD AUTO: 88 % (ref 42.7–76)
NRBC BLD AUTO-RTO: 0 /100 WBC (ref 0–0.2)
PLATELET # BLD AUTO: 269 10*3/MM3 (ref 140–450)
PMV BLD AUTO: 9.8 FL (ref 6–12)
POTASSIUM SERPL-SCNC: 4.5 MMOL/L (ref 3.5–5.2)
RBC # BLD AUTO: 4.63 10*6/MM3 (ref 3.77–5.28)
SODIUM SERPL-SCNC: 139 MMOL/L (ref 136–145)
WBC # BLD AUTO: 11.54 10*3/MM3 (ref 3.4–10.8)

## 2020-10-09 PROCEDURE — 80048 BASIC METABOLIC PNL TOTAL CA: CPT | Performed by: INTERNAL MEDICINE

## 2020-10-09 PROCEDURE — 85025 COMPLETE CBC W/AUTO DIFF WBC: CPT | Performed by: INTERNAL MEDICINE

## 2020-10-09 PROCEDURE — 99238 HOSP IP/OBS DSCHRG MGMT 30/<: CPT | Performed by: INTERNAL MEDICINE

## 2020-10-09 PROCEDURE — 82962 GLUCOSE BLOOD TEST: CPT

## 2020-10-09 RX ORDER — METOPROLOL SUCCINATE 25 MG/1
25 TABLET, EXTENDED RELEASE ORAL
Start: 2020-10-10

## 2020-10-09 RX ORDER — METOPROLOL SUCCINATE 25 MG/1
25 TABLET, EXTENDED RELEASE ORAL
Status: DISCONTINUED | OUTPATIENT
Start: 2020-10-09 | End: 2020-10-09 | Stop reason: HOSPADM

## 2020-10-09 RX ORDER — OXYCODONE HYDROCHLORIDE AND ACETAMINOPHEN 5; 325 MG/1; MG/1
1 TABLET ORAL EVERY 6 HOURS PRN
Status: DISCONTINUED | OUTPATIENT
Start: 2020-10-09 | End: 2020-10-09 | Stop reason: HOSPADM

## 2020-10-09 RX ORDER — OXYCODONE HYDROCHLORIDE AND ACETAMINOPHEN 5; 325 MG/1; MG/1
1 TABLET ORAL EVERY 6 HOURS PRN
Start: 2020-10-09 | End: 2020-10-19

## 2020-10-09 RX ADMIN — ESCITALOPRAM OXALATE 40 MG: 20 TABLET ORAL at 09:15

## 2020-10-09 RX ADMIN — METOPROLOL SUCCINATE 25 MG: 25 TABLET, EXTENDED RELEASE ORAL at 10:59

## 2020-10-09 RX ADMIN — OXYCODONE HYDROCHLORIDE AND ACETAMINOPHEN 1 TABLET: 5; 325 TABLET ORAL at 17:12

## 2020-10-09 RX ADMIN — LISINOPRIL 40 MG: 40 TABLET ORAL at 09:15

## 2020-10-09 RX ADMIN — ACETAMINOPHEN 650 MG: 325 TABLET, FILM COATED ORAL at 04:57

## 2020-10-09 RX ADMIN — OXYCODONE HYDROCHLORIDE AND ACETAMINOPHEN 1 TABLET: 5; 325 TABLET ORAL at 10:59

## 2020-10-09 RX ADMIN — FAMOTIDINE 20 MG: 10 INJECTION INTRAVENOUS at 09:16

## 2020-10-09 NOTE — PLAN OF CARE
Goal Outcome Evaluation:  Patient rested comfortably throughout the night. Had one episode of syncope around 0430 when trying to get up to go to the bathroom. PM appears to fire intermittently.

## 2020-10-09 NOTE — PROGRESS NOTES
Case Management Discharge Note      Final Note: Plan is for pt. to dc to home. Voices no needs at this time. States family can pick her up at 1600pm today.         Selected Continued Care - Admitted Since 10/7/2020     Destination    No services have been selected for the patient.              Durable Medical Equipment    No services have been selected for the patient.              Dialysis/Infusion    No services have been selected for the patient.              Home Medical Care    No services have been selected for the patient.              Therapy    No services have been selected for the patient.              Community Resources    No services have been selected for the patient.                       Final Discharge Disposition Code: 01 - home or self-care

## 2020-10-09 NOTE — SIGNIFICANT NOTE
"RRT called due to rash and \"throat swelling\".  Reports eating pie that she thinks had cinnamon on it.  Given epi with immediate response.  Cinnamon has now been added to her allergy list.  Will keep overnight. DC in am  "

## 2020-10-09 NOTE — NURSING NOTE
At approximately 1945 patient complained of a rash developing on the right side of her neck and face as well as swelling around her eyes. She then stated she felt her throat was tightening and she was having trouble breathing. Patient was given 25 mg of IV benadryl x 2, 125mg of solu-medrol and 20 mg of pepcid. Patient then stated that she thought there was cinnamon on her dessert at dinner and she was allergic to cinnamon. She was then given .3mg epi IM. Patient's symptoms resolved and vitals stable.

## 2020-10-09 NOTE — DISCHARGE SUMMARY
[facility] Medicine Services  DISCHARGE SUMMARY    Patient Name: Yamilet Allan  : 1970  MRN: [id number]    Date of Admission: 10/7/2020  9:42 PM  Date of Discharge:  10/9/2020  Primary Care Physician: Jaimee Bobo MD    Consults     Date and Time Order Name Status Description    10/8/2020 0122 Inpatient Cardiology Consult Completed           Hospital Course     Presenting Problem:   Syncope [R55]  Syncope [R55]    Active Hospital Problems    Diagnosis  POA   • **Syncope [R55]  Yes   • HLD (hyperlipidemia) [E78.5]  Yes   • AV block [I44.30]  Yes   • Cardiomyopathy (CMS/HCC) [I42.9]  Yes   • Essential hypertension [I10]  Yes      Resolved Hospital Problems   No resolved problems to display.          Hospital Course:  Yamilet Allan is a 50 y.o. female with history of anxiety, depression, tachy-rika  Syndrome, AV block who was scheduled for PPM with Dr Man Monday 10/12, who presents from home after 2 syncopal episodes. Patient reports multiple symptoms including intermittent chest pain, fatigue, sob, nausea. EKG noted first degree AV block. Patient was ultimately evaluated by Dr Man of cardiology and underwent successful PPM placement 10/8. She was started on metoprolol and given short supply of oxycodone for pain control. She was set to be discharged home 10/8 evening, however had allergic rxn with throat swelling to cinnamon on her pie. This was relieved with epi- she was doing well after observation overnight and had no further issues. She will f/u with PCP and Dr Man closely.          Discharge Follow Up Recommendations for outpatient labs/diagnostics:  PCP- Dr Bobo- 1-2 weeks  Dr Man PPM check 2 weeks    Day of Discharge     HPI:   Patient feeling well. Would like to be discharged. Had allergic rxn to cinnamon in pumpkin pie overnight but better with epi. Patient has epi pens at home.No breathing issues this morning    Review of Systems  Gen- No fevers,  chills  CV- No chest pain, palpitations  Resp- No cough, dyspnea  GI- No N/V/D, abd pain        Vital Signs:   Temp:  [97.7 °F (36.5 °C)-98 °F (36.7 °C)] 98 °F (36.7 °C)  Heart Rate:  [] 86  Resp:  [14-19] 16  BP: (100-153)/(72-96) 128/82     Physical Exam:  GEN- no acute distress noted, resting in bed, awake  HEENT- atraumatic, normocephalic, eomi  NECK- supple, trachea midline, no masses  RESP: ctab, normal effort, PPM c/d/i- arm in sling  CV: no murmurs, s1/s2, rrr, pacer spikes seen  MSK: no edema noted, spontaneous movement of all extremities  NEURO: alert, oriented, no focal deficits  SKIN: no rashes  PSYCH: appropriate mood and affect       Pertinent  and/or Most Recent Results     Results from last 7 days   Lab Units 10/09/20  0844 10/08/20  0308 10/07/20  2214   WBC 10*3/mm3 11.54* 6.90 8.34   HEMOGLOBIN g/dL 14.1 12.7 13.5   HEMATOCRIT % 43.7 40.3 41.8   PLATELETS 10*3/mm3 269 246 278   SODIUM mmol/L 139 138 140   POTASSIUM mmol/L 4.5 4.2 3.8   CHLORIDE mmol/L 102 103 102   CO2 mmol/L 26.0 23.0 29.0   BUN mg/dL 14 13 13   CREATININE mg/dL 0.80 0.84 0.84   GLUCOSE mg/dL 170* 89 92   CALCIUM mg/dL 9.8 8.8 9.5     Results from last 7 days   Lab Units 10/07/20  2214   BILIRUBIN mg/dL 0.2   ALK PHOS U/L 78   ALT (SGPT) U/L 31   AST (SGOT) U/L 24   PROTIME Seconds 12.8   INR  0.99           Invalid input(s): TG, LDLCALC, LDLREALC  Results from last 7 days   Lab Units 10/08/20  2202 10/08/20  0308 10/07/20  2214   TSH uIU/mL  --   --  3.760   TROPONIN T ng/mL <0.010 <0.010 <0.010       Brief Urine Lab Results  (Last result in the past 365 days)      Color   Clarity   Blood   Leuk Est   Nitrite   Protein   CREAT   Urine HCG        10/07/20 2326 Yellow Clear Negative Negative Negative Negative               Microbiology Results Abnormal     Procedure Component Value - Date/Time    COVID PRE-OP / PRE-PROCEDURE SCREENING ORDER (NO ISOLATION) - Swab, Nasopharynx [[id number]]  (Normal) Collected: 10/07/20 8781     Lab Status: Final result Specimen: Swab from Nasopharynx Updated: 10/08/20 0153    Narrative:      The following orders were created for panel order COVID PRE-OP / PRE-PROCEDURE SCREENING ORDER (NO ISOLATION) - Swab, Nasopharynx.  Procedure                               Abnormality         Status                     ---------                               -----------         ------                     Respiratory Panel PCR w/...[[id number]]  Normal              Final result                 Please view results for these tests on the individual orders.    Respiratory Panel PCR w/COVID-19(SARS-CoV-2) [facility]-House, NP Swab in UTM/VTM, 3-4 HR TAT - Swab, Nasopharynx [[id number]]  (Normal) Collected: 10/07/20 2323    Lab Status: Final result Specimen: Swab from Nasopharynx Updated: 10/08/20 0153     ADENOVIRUS, PCR Not Detected     Coronavirus 229E Not Detected     Coronavirus HKU1 Not Detected     Coronavirus NL63 Not Detected     Coronavirus OC43 Not Detected     COVID19 Not Detected     Human Metapneumovirus Not Detected     Human Rhinovirus/Enterovirus Not Detected     Influenza A PCR Not Detected     Influenza A H1 Not Detected     Influenza A H1 2009 PCR Not Detected     Influenza A H3 Not Detected     Influenza B PCR Not Detected     Parainfluenza Virus 1 Not Detected     Parainfluenza Virus 2 Not Detected     Parainfluenza Virus 3 Not Detected     Parainfluenza Virus 4 Not Detected     RSV, PCR Not Detected     Bordetella pertussis pcr Not Detected     Bordetella parapertussis PCR Not Detected     Chlamydophila pneumoniae PCR Not Detected     Mycoplasma pneumo by PCR Not Detected    Narrative:      Fact sheet for providers: https://docs.[employer]/wp-content/uploads/HME6053-7618-TB3.1-EUA-Provider-Fact-Sheet-3.pdf    Fact sheet for patients: https://docs.[employer]/wp-content/uploads/LED7827-8502-AS6.1-EUA-Patient-Fact-Sheet-1.pdf          Imaging Results (All)     Procedure Component Value  Units Date/Time    XR Chest 1 View [[id number]] Collected: 10/08/20 2138     Updated: 10/08/20 2140    Narrative:      CR Chest 1 Vw    INDICATION:   50-year-old female with respiratory distress today. Pacemaker placement today.     COMPARISON:    Chest 10/8/2020 at 6:09 PM  Chest 10/22/2019    FINDINGS:  Single portable AP view(s) of the chest at 9:01 PM.    Left-sided pacing complex again noted with leads projecting over the right atrium and right ventricle. No pneumothorax. Trace left pleural effusion. The lungs are otherwise clear. Heart size is upper limits. Mediastinum and pulmonary vasculature are  unremarkable.        Impression:      Stable left-sided pacing complex and trace left pleural effusion.    Signer Name: Bart Gallardo MD   Signed: 10/8/2020 9:38 PM   Workstation Name: [id number]    [facility]    XR Chest 1 View [[id number]] Collected: 10/08/20 2137     Updated: 10/08/20 2140    Narrative:      CR Chest 1 Vw    INDICATION:   50-year-old female status post pacemaker placement today.     COMPARISON:    Chest 10/22/2019    FINDINGS:  Single portable AP view(s) of the chest at 6:09 PM.    Left-sided pacing complex with leads projecting over the right atrium and right ventricle. No pneumothorax. Trace left pleural effusion. The lungs are otherwise clear. Heart size is upper limits of normal. Mediastinum and pulmonary vasculature are  unremarkable.        Impression:        1. Left-sided pacing complex with leads projecting over the right atrium and right ventricle.  2. No pneumothorax.  3. Trace left pleural effusion.    Signer Name: Bart Gallardo MD   Signed: 10/8/2020 9:37 PM   Workstation Name: [email or web address]    [facility]                    Results for orders placed during the hospital encounter of 10/05/20   Adult Transthoracic Echo Complete W/ Cont if Necessary Per Protocol    Narrative · Left ventricular ejection fraction appears to be 61 - 65%.  Left   ventricular systolic function is normal.          Plan for Follow-up of Pending Labs/Results: None    Discharge Details        Discharge Medications      New Medications      Instructions Start Date   metoprolol succinate XL 25 MG 24 hr tablet  Commonly known as: TOPROL-XL   25 mg, Oral, Daily      metoprolol succinate XL 25 MG 24 hr tablet  Commonly known as: TOPROL-XL   25 mg, Oral, Every 24 Hours Scheduled   Start Date: October 10, 2020     oxyCODONE-acetaminophen 5-325 MG per tablet  Commonly known as: Percocet   1 tablet, Oral, Every 6 Hours PRN      oxyCODONE-acetaminophen 5-325 MG per tablet  Commonly known as: PERCOCET   1 tablet, Oral, Every 6 Hours PRN         Continue These Medications      Instructions Start Date   albuterol 0.63 MG/3ML nebulizer solution  Commonly known as: ACCUNEB   0.63 mg, Nebulization, Every 6 Hours PRN      EPINEPHrine 0.3 MG/0.3ML solution auto-injector injection  Commonly known as: EPIPEN   As Needed      escitalopram 20 MG tablet  Commonly known as: LEXAPRO   40 mg, Oral, Daily      lisinopril 40 MG tablet  Commonly known as: PRINIVIL,ZESTRIL   40 mg, Oral, Daily      Prenatal 28-0.8 MG tablet   Oral, Daily             Allergies   Allergen Reactions   • Bee Venom Anaphylaxis   • Cat Hair Extract Anaphylaxis   • Cinnamon Anaphylaxis   • Molds & Smuts Anaphylaxis   • Penicillins Anaphylaxis         Discharge Disposition:  Home or Self Care    Diet:  Hospital:  Diet Order   Procedures   • Diet Regular       Activity:  As tolerated    Restrictions or Other Recommendations:  Restrictions per cardiology in regards to recent PPM       CODE STATUS:    Code Status and Medical Interventions:   Ordered at: 10/08/20 0003     Level Of Support Discussed With:    Patient     Code Status:    CPR     Medical Interventions (Level of Support Prior to Arrest):    Full       Future Appointments   Date Time Provider Department Center   3/9/2021  1:15 PM Veto Atwood III, MD [facility] None        Additional Instructions for the Follow-ups that You Need to Schedule     Discharge Follow-up with PCP   As directed       Currently Documented PCP:    Jaimee Bobo MD    PCP Phone Number:    [phone]     Follow Up Details: Dr Bobo 1-2 weeks         Discharge Follow-up with Specialty: Cardiology - Wound and device check in 12-14 days with additional follow up 91 days after wound check.; 2 Weeks   As directed      Specialty: Cardiology - Wound and device check in 12-14 days with additional follow up 91 days after wound check.    Follow Up: 2 Weeks    Follow Up Details: Cardiology - Wound and device check in 12-14 days with additional follow up 91 days after wound check.         Discharge Follow-up with Specified Provider: Donovan 12-14 days   As directed      To: Donovan 12-14 days                     Eva Gastelum MD  10/09/20      Time Spent on Discharge:  I spent  25 minutes on this discharge activity which included: face-to-face encounter with the patient, reviewing the data in the system, coordination of the care with the nursing staff as well as consultants, documentation, and entering orders.

## 2020-10-12 ENCOUNTER — TELEPHONE (OUTPATIENT)
Dept: CARDIOLOGY | Facility: CLINIC | Age: 50
End: 2020-10-12

## 2020-10-12 NOTE — TELEPHONE ENCOUNTER
Patient states that she feels faint and nauseated. She also fainted on the way to [facility] to get her pain medications. She stated that the SOA is gone since the PPM was placed, however she has episodes of tachy palpitations that make her break out into a cold sweat and feels that she will faint. I transferred her to the device clinic so they could do a remote.

## 2020-10-13 NOTE — TELEPHONE ENCOUNTER
Patient called again today stating she is still having palpitations, is nauseated, lightheaded and having cold sweats. I relayed that her remote transmission was normal and I would touch base with you about what she should do.

## 2020-10-14 NOTE — TELEPHONE ENCOUNTER
Patient called back again today saying she is still dizzy 50% of the time, nauseated, and having massive palpitations all the time.

## 2020-10-19 ENCOUNTER — OFFICE VISIT (OUTPATIENT)
Dept: CARDIOLOGY | Facility: CLINIC | Age: 50
End: 2020-10-19

## 2020-10-19 VITALS
HEART RATE: 70 BPM | DIASTOLIC BLOOD PRESSURE: 70 MMHG | SYSTOLIC BLOOD PRESSURE: 100 MMHG | WEIGHT: 189 LBS | HEIGHT: 67 IN | BODY MASS INDEX: 29.66 KG/M2

## 2020-10-19 DIAGNOSIS — R55 SYNCOPE, UNSPECIFIED SYNCOPE TYPE: ICD-10-CM

## 2020-10-19 DIAGNOSIS — I10 ESSENTIAL HYPERTENSION: Primary | ICD-10-CM

## 2020-10-19 DIAGNOSIS — I44.30 AV BLOCK: ICD-10-CM

## 2020-10-19 PROCEDURE — 99213 OFFICE O/P EST LOW 20 MIN: CPT | Performed by: PHYSICIAN ASSISTANT

## 2020-11-04 ENCOUNTER — TELEPHONE (OUTPATIENT)
Dept: CARDIOLOGY | Facility: CLINIC | Age: 50
End: 2020-11-04

## 2020-11-04 NOTE — TELEPHONE ENCOUNTER
Patient called back and started to leave a message but her phone was cutting out and I could not understand the message. She is still currently in the hospital and I attempted to call her back but got voicemail again.

## 2020-11-04 NOTE — TELEPHONE ENCOUNTER
I called the patient back again and she states she is at [facility] ER because her HR was 110 and O2 sat 93%.

## 2020-12-07 ENCOUNTER — OFFICE VISIT (OUTPATIENT)
Dept: CARDIOLOGY | Facility: CLINIC | Age: 50
End: 2020-12-07

## 2020-12-07 ENCOUNTER — TELEPHONE (OUTPATIENT)
Dept: CARDIOLOGY | Facility: CLINIC | Age: 50
End: 2020-12-07

## 2020-12-07 VITALS
WEIGHT: 183 LBS | SYSTOLIC BLOOD PRESSURE: 140 MMHG | HEART RATE: 70 BPM | HEIGHT: 67 IN | DIASTOLIC BLOOD PRESSURE: 102 MMHG | BODY MASS INDEX: 28.72 KG/M2 | OXYGEN SATURATION: 93 %

## 2020-12-07 DIAGNOSIS — I10 ESSENTIAL HYPERTENSION: ICD-10-CM

## 2020-12-07 DIAGNOSIS — R55 SYNCOPE, UNSPECIFIED SYNCOPE TYPE: Primary | ICD-10-CM

## 2020-12-07 DIAGNOSIS — I44.2 AV BLOCK, COMPLETE (HCC): ICD-10-CM

## 2020-12-07 PROCEDURE — 99442 PR PHYS/QHP TELEPHONE EVALUATION 11-20 MIN: CPT | Performed by: PHYSICIAN ASSISTANT

## 2020-12-07 RX ORDER — ONDANSETRON 4 MG/1
4 TABLET, FILM COATED ORAL EVERY 8 HOURS PRN
COMMUNITY

## 2020-12-07 NOTE — PROGRESS NOTES
"    Yamilet Allan  1970  PCP: Jaimee Bobo MD    SUBJECTIVE:   Yamilet Allan is a 50 y.o. female seen for a follow up visit regarding the following:     Chief Complaint: Follow up for pacemaker check, tachycardia, syncope     HPI:    Since last visit the patient's status has been unstable. She underwent pacemaker implant 10/8/20 for transient AV block and syncope. She continued to have syncopal spells and tachycardia. She was seen at [place] [facility] and I have obtained those records. She had no abnormal heart rhythms on her remote interrogation and also had a normal stress test and echocardiogram during admission at [place] 11/6/20. Her Toprol XL dose was increased to 50mg BID and she has not had any recurrent episodes. She expresses great concern that she feels \"our office was not responsive or seemed to care\" when she called with reports of tachycardia. She is also very concerned that she has never seen Dr. Man since her device was implanted, but I reassured her that following up 3 months post op is the standard of care. Towards the end of our visit she did seem to be reassured with care she was receiving from our practice and felt much \"more at ease\".                                                                                                                                                                  History:  This is a 50-year-old patient referred by Dr. Veto Atwood for further evaluation management of syncope with associated complete AV block.  The patient reports that she has had issues since October 2015 when she had a cerebral vascular accident.  She is had great fluctuation in her heart rate over the years.  She reports that she now is experiencing tachybradycardia syndrome on a daily basis.  She also had episodes of near syncope where she will have sudden near transient loss of consciousness.  She has implantable loop recorder that is documented transient complete AV " block.        Cardiac PMH: (Old records have been reviewed and summarized below)  1.  CVA-October 2015  2.  Tachybradycardia syndrome  3.  Near syncope  4.  [employer] loop implant  5.  Documented transient complete AV block  6.  Echo-October 2020-normal ejection fraction  7. [employer] Dual Chamber Pacemaker implant 10/8/20          Current Outpatient Medications:   •  albuterol (ACCUNEB) 0.63 MG/3ML nebulizer solution, Take 3 mL by nebulization Every 6 (Six) Hours As Needed for Wheezing., Disp: 3 mL, Rfl: 0  •  EPINEPHrine (EPIPEN) 0.3 MG/0.3ML solution auto-injector injection, As Needed., Disp: , Rfl:   •  escitalopram (LEXAPRO) 20 MG tablet, Take 20 mg by mouth Daily., Disp: , Rfl:   •  lisinopril (PRINIVIL,ZESTRIL) 20 MG tablet, Take 20 mg by mouth Daily., Disp: , Rfl:   •  metoprolol succinate XL (TOPROL-XL) 25 MG 24 hr tablet, Take 1 tablet by mouth Daily. (Patient taking differently: Take 50 mg by mouth 2 (two) times a day.), Disp:  , Rfl:   •  ondansetron (ZOFRAN) 4 MG tablet, Take 4 mg by mouth Every 8 (Eight) Hours As Needed for Nausea or Vomiting., Disp: , Rfl:   •  Prenatal 28-0.8 MG tablet, Take  by mouth Daily., Disp: , Rfl:     Past Medical History, Past Surgical History, Family history, Social History, and Medications were all reviewed with the patient today and updated as necessary.       Patient Active Problem List   Diagnosis   • Syncope and collapse   • Essential hypertension   • AV block, complete (CMS/HCC)   • Syncope   • HLD (hyperlipidemia)   • AV block   • Cardiomyopathy (CMS/HCC)     Allergies   Allergen Reactions   • Bee Venom Anaphylaxis   • Cat Hair Extract Anaphylaxis   • Cinnamon Anaphylaxis   • Molds & Smuts Anaphylaxis   • Penicillins Anaphylaxis     Past Medical History:   Diagnosis Date   • Anxiety and depression    • Asthma    • Cardiomyopathy (CMS/HCC)    • Hyperlipidemia    • Hypertension    • Menopause    • Stroke (CMS/HCC)      Past Surgical History:   Procedure Laterality Date  "  • ADENOIDECTOMY     • CARDIAC ELECTROPHYSIOLOGY PROCEDURE N/A 10/8/2020    Procedure: PACEMAKER IMPLANTATION- DC;  Surgeon: Lewis Man MD;  Location:  [facility];  Service: Cardiology;  Laterality: N/A;   • CARDIAC ELECTROPHYSIOLOGY PROCEDURE N/A 10/8/2020    Procedure: DEVICE EXPLANT;  Surgeon: Lewis Man MD;  Location: [facility];  Service: Cardiology;  Laterality: N/A;   •  SECTION     • COLONOSCOPY     • OTHER SURGICAL HISTORY      LOOP RECORDER   • RHINOPLASTY     • TONSILLECTOMY       Family History   Problem Relation Age of Onset   • Hypertension Mother    • Heart failure Father    • Cardiomyopathy Sister    • Heart attack Sister    • Diabetes Sister    • Stroke Brother    • Heart attack Brother    • Hypertension Brother    • Hyperlipidemia Brother    • Stroke Brother    • Hyperlipidemia Brother    • Hypertension Brother    • Hypertension Sister    • Hypertension Sister    • Hypertension Sister    • Hypertension Sister    • Hypertension Sister    • No Known Problems Sister    • No Known Problems Sister      Social History     Tobacco Use   • Smoking status: Never Smoker   • Smokeless tobacco: Never Used   Substance Use Topics   • Alcohol use: No     Frequency: Never         PHYSICAL EXAM:    BP (!) 140/102 (BP Location: Left arm, Patient Position: Sitting)   Pulse 70   Ht 170.2 cm (67\")   Wt 83 kg (183 lb)   SpO2 93%   BMI 28.66 kg/m²        Wt Readings from Last 5 Encounters:   20 83 kg (183 lb)   10/19/20 85.7 kg (189 lb)   10/08/20 88 kg (193 lb 14.4 oz)   10/06/20 84.9 kg (187 lb 3.2 oz)   20 82.1 kg (181 lb)       BP Readings from Last 5 Encounters:   20 (!) 140/102   10/19/20 100/70   10/09/20 118/82   10/06/20 110/82   20 114/76         Medical problems and test results were reviewed with the patient today.     No results found for this or any previous visit (from the past 672 hour(s)).      EKG: (EKG has been " independently visualized by me and summarized below)    Procedures     ASSESSMENT and PLAN    1.  Transient AV block-has associated symptomatic bradycardia.  Now s/p [employer] Dual Chamber pacemaker 10/8/20. May consider cardiac MRI in the future for further evaluation.  2.  Tachybradycardia syndrome-now s/p dual chamber pacemaker. Currently on Metoprolol XL 50mg BID.   3.  Syncope-appears to be multifactorial with an element of autonomic dysfunction along with AV block. I decreased her Lisinopril to 20mg daily at last visit as she was hypotensive. She was seen at [place] 11/6/20 and had benign workup- including normal echo and stress test. Her Toprol was increased to BID dosing at that time for sinus tachycardia and she has not had anymore syncopal spells.   4. [employer] dual chamber pacemaker: I had patient send in remote interrogation. She has not had any events and pacemaker is functioning normally.   5. Palpitations: no abnormal events on pacemaker interrogation. Likely secondary to sinus tachycardia. Has improved with increase in beta blockade.        Return for Next scheduled follow-up 11/19/20 w/ [facility] .      This patient has consented to a telehealth visit via telephone. The visit was scheduled as a telephone visit to comply with patient safety concerns in accordance with CDC recommendations.  All vitals recorded within this visit are reported by the patient.  I spent  35 minutes in total including but not limited to the 11 minutes spent in direct conversation with this patient.     Roselia Alberto PA-C   Cardiology/Electrophysiology  12/7/2020  11:16 EST

## 2020-12-07 NOTE — TELEPHONE ENCOUNTER
Called pt and had her send a transmission per Roselia Alberto. Transmission was normal. with no episodes or events.Pt verbalized satisfaction.

## 2021-02-20 PROCEDURE — 93294 REM INTERROG EVL PM/LDLS PM: CPT | Performed by: INTERNAL MEDICINE

## 2021-02-20 PROCEDURE — 93296 REM INTERROG EVL PM/IDS: CPT | Performed by: INTERNAL MEDICINE

## 2021-02-25 ENCOUNTER — TELEPHONE (OUTPATIENT)
Dept: CARDIOLOGY | Facility: CLINIC | Age: 51
End: 2021-02-25

## 2021-02-25 NOTE — TELEPHONE ENCOUNTER
Called Mrs Allan in regards of getting a request from [facility] to transfer home monitor readings.  She confirms she is seeing a physician there and it is ok to transfer readings to them.

## (undated) DEVICE — TUBING, SUCTION, 1/4" X 10', STRAIGHT: Brand: MEDLINE

## (undated) DEVICE — Device

## (undated) DEVICE — LIMB HOLDER, WRIST/ANKLE: Brand: DEROYAL

## (undated) DEVICE — SKIN PREP TRAY W/CHG: Brand: MEDLINE INDUSTRIES, INC.

## (undated) DEVICE — SET PRIMARY GRVTY 10DP MALE LL 104IN

## (undated) DEVICE — ST INF PRI SMRTSTE 20DRP 2VLV 24ML 117

## (undated) DEVICE — ST EXT IV SMARTSITE 2VLV SP M LL 5ML IV1

## (undated) DEVICE — LEX ELECTRO PHYSIOLOGY: Brand: MEDLINE INDUSTRIES, INC.

## (undated) DEVICE — IRRIGATOR BULB ASEPTO 60CC STRL

## (undated) DEVICE — DRSNG SURG AQUACEL AG 9X15CM

## (undated) DEVICE — SOL NACL 0.9PCT 1000ML

## (undated) DEVICE — MEDI-VAC YANKAUER SUCTION HANDLE W/BULBOUS TIP: Brand: CARDINAL HEALTH

## (undated) DEVICE — PROXIMATE RH ROTATING HEAD SKIN STAPLERS (35 WIDE) CONTAINS 35 STAINLESS STEEL STAPLES: Brand: PROXIMATE

## (undated) DEVICE — SOL NS 500ML

## (undated) DEVICE — PENCL E/S HNDSWCH ROCKRBTN HOLSTR 10FT

## (undated) DEVICE — CANN NASL CO2 DIVIDED A/

## (undated) DEVICE — ADULT, W/LG. BACK PAD, RADIOTRANSPARENT ELEMENT AND LEAD WIRE: Brand: DEFIBRILLATION ELECTRODES

## (undated) DEVICE — CAUTERY TIP POLISHER: Brand: DEVON

## (undated) DEVICE — DECANT BG O JET

## (undated) DEVICE — INTRO TEAR AWAY/LVD W/SD PRT 6F 13CM